# Patient Record
Sex: MALE | Race: WHITE | NOT HISPANIC OR LATINO | Employment: UNEMPLOYED | URBAN - METROPOLITAN AREA
[De-identification: names, ages, dates, MRNs, and addresses within clinical notes are randomized per-mention and may not be internally consistent; named-entity substitution may affect disease eponyms.]

---
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
## 2019-01-29 ENCOUNTER — OFFICE VISIT (OUTPATIENT)
Dept: FAMILY MEDICINE CLINIC | Facility: CLINIC | Age: 19
End: 2019-01-29
Payer: COMMERCIAL

## 2019-01-29 ENCOUNTER — TELEPHONE (OUTPATIENT)
Dept: NEUROLOGY | Facility: CLINIC | Age: 19
End: 2019-01-29

## 2019-01-29 VITALS
BODY MASS INDEX: 27.99 KG/M2 | SYSTOLIC BLOOD PRESSURE: 100 MMHG | DIASTOLIC BLOOD PRESSURE: 78 MMHG | HEIGHT: 69 IN | TEMPERATURE: 97.4 F | WEIGHT: 189 LBS

## 2019-01-29 DIAGNOSIS — J30.9 ALLERGIC RHINITIS, UNSPECIFIED SEASONALITY, UNSPECIFIED TRIGGER: ICD-10-CM

## 2019-01-29 DIAGNOSIS — J01.00 ACUTE NON-RECURRENT MAXILLARY SINUSITIS: Primary | ICD-10-CM

## 2019-01-29 DIAGNOSIS — H69.80 DYSFUNCTION OF EUSTACHIAN TUBE, UNSPECIFIED LATERALITY: ICD-10-CM

## 2019-01-29 PROCEDURE — 1036F TOBACCO NON-USER: CPT | Performed by: PHYSICIAN ASSISTANT

## 2019-01-29 PROCEDURE — 3008F BODY MASS INDEX DOCD: CPT | Performed by: PHYSICIAN ASSISTANT

## 2019-01-29 PROCEDURE — 99214 OFFICE O/P EST MOD 30 MIN: CPT | Performed by: PHYSICIAN ASSISTANT

## 2019-01-29 RX ORDER — AZITHROMYCIN 250 MG/1
TABLET, FILM COATED ORAL
Qty: 6 TABLET | Refills: 0 | Status: SHIPPED | OUTPATIENT
Start: 2019-01-29 | End: 2019-02-03

## 2019-01-29 NOTE — PATIENT INSTRUCTIONS
1   Acute sinusitis-recommended Zithromax Z-Kael as directed  The patient was also advised to use Flonase, 2 sprays in each nostril daily  They may use over-the-counter NSAIDs such as ibuprofen as necessary for pressure  Follow-up in the next 4-5 days if not improved  2  Allergic rhinitis-stable with Singulair, no medication changes  3  Eustachian tube dysfunction-continue Flonase as necessary

## 2019-01-29 NOTE — PROGRESS NOTES
Assessment/Plan:  Patient Instructions   1  Acute sinusitis-recommended Zithromax Z-Kael as directed  The patient was also advised to use Flonase, 2 sprays in each nostril daily  They may use over-the-counter NSAIDs such as ibuprofen as necessary for pressure  Follow-up in the next 4-5 days if not improved  2  Allergic rhinitis-stable with Singulair, no medication changes  3  Eustachian tube dysfunction-continue Flonase as necessary  Diagnoses and all orders for this visit:    Acute non-recurrent maxillary sinusitis  -     azithromycin (ZITHROMAX) 250 mg tablet; Take 2 Tabs by mouth today, then Take 1 tablet daily for 4 more days  Allergic rhinitis, unspecified seasonality, unspecified trigger    Dysfunction of Eustachian tube, unspecified laterality          Subjective:   C/o pressure around left eye, can't hear well out of ears  Onset today  mjs     Patient ID: May Mckeon is a 22 y o  male  HPI:  This is a 65-year-old gentleman that presents to the office with concerns over pressure behind his left eye and forehead area  He has also had some sinus congestion and a little bit of ear discomfort  He does have concern of a possible ear infection as he has had these recurrently in the past   He has not had any fevers or chills  He does not have any photophobia or sensitivity to light or noise is  He has not had any history of migraine headaches previously  He does note that the pain and pressure seems to exacerbate when he is tilting his head forward or turning his head side to side quickly  The following portions of the patient's history were reviewed and updated as appropriate: allergies, current medications, past family history, past medical history, past social history, past surgical history and problem list     Review of Systems   Constitutional: Negative for activity change, fatigue and fever  HENT: Positive for congestion and sinus pressure   Negative for ear pain, postnasal drip, rhinorrhea and sore throat  Respiratory: Negative for cough, chest tightness, shortness of breath and wheezing  Cardiovascular: Negative for palpitations  Gastrointestinal: Negative for diarrhea and nausea  Musculoskeletal: Negative for arthralgias and myalgias  Skin: Negative for rash  Neurological: Negative for dizziness and numbness  All other systems reviewed and are negative  Objective:      /78   Temp (!) 97 4 °F (36 3 °C)   Ht 5' 9" (1 753 m)   Wt 85 7 kg (189 lb)   BMI 27 91 kg/m²          Physical Exam   Constitutional: He is oriented to person, place, and time  He appears well-developed and well-nourished  No distress  HENT:   Head: Normocephalic and atraumatic  Mouth/Throat: No oropharyngeal exudate  Turbinates are erythematous and edematous bilaterally  Post nasal drip of the posterior pharynx noted  Eyes: Pupils are equal, round, and reactive to light  EOM are normal  Right eye exhibits no discharge  Left eye exhibits no discharge  Neck: Neck supple  Cardiovascular: Normal rate, regular rhythm and normal heart sounds  Exam reveals no friction rub  No murmur heard  Pulmonary/Chest: Effort normal and breath sounds normal  No respiratory distress  He has no wheezes  He has no rales  Musculoskeletal: Normal range of motion  He exhibits no edema  Lymphadenopathy:     He has cervical adenopathy  Neurological: He is alert and oriented to person, place, and time  No cranial nerve deficit  Coordination normal    Skin: Skin is warm and dry  No erythema  Psychiatric: He has a normal mood and affect  His behavior is normal    Nursing note and vitals reviewed

## 2019-05-20 ENCOUNTER — OFFICE VISIT (OUTPATIENT)
Dept: FAMILY MEDICINE CLINIC | Facility: CLINIC | Age: 19
End: 2019-05-20
Payer: COMMERCIAL

## 2019-05-20 VITALS
WEIGHT: 186 LBS | BODY MASS INDEX: 27.55 KG/M2 | HEART RATE: 64 BPM | SYSTOLIC BLOOD PRESSURE: 114 MMHG | HEIGHT: 69 IN | DIASTOLIC BLOOD PRESSURE: 62 MMHG

## 2019-05-20 DIAGNOSIS — J30.9 ALLERGIC RHINITIS, UNSPECIFIED SEASONALITY, UNSPECIFIED TRIGGER: ICD-10-CM

## 2019-05-20 DIAGNOSIS — R04.0 ANTERIOR EPISTAXIS: Primary | ICD-10-CM

## 2019-05-20 PROBLEM — J34.89 NASAL CRUSTING: Status: ACTIVE | Noted: 2019-05-20

## 2019-05-20 PROCEDURE — 99213 OFFICE O/P EST LOW 20 MIN: CPT | Performed by: FAMILY MEDICINE

## 2019-05-20 PROCEDURE — 3008F BODY MASS INDEX DOCD: CPT | Performed by: FAMILY MEDICINE

## 2019-05-20 RX ORDER — ALBUTEROL SULFATE 90 UG/1
2 AEROSOL, METERED RESPIRATORY (INHALATION) EVERY 4 HOURS PRN
COMMUNITY

## 2019-05-23 ENCOUNTER — OFFICE VISIT (OUTPATIENT)
Dept: URGENT CARE | Age: 19
End: 2019-05-23
Payer: COMMERCIAL

## 2019-05-23 VITALS
BODY MASS INDEX: 27.25 KG/M2 | SYSTOLIC BLOOD PRESSURE: 131 MMHG | TEMPERATURE: 97.5 F | HEART RATE: 85 BPM | OXYGEN SATURATION: 97 % | RESPIRATION RATE: 16 BRPM | HEIGHT: 69 IN | DIASTOLIC BLOOD PRESSURE: 66 MMHG | WEIGHT: 184 LBS

## 2019-05-23 DIAGNOSIS — J06.9 VIRAL URI WITH COUGH: Primary | ICD-10-CM

## 2019-05-23 PROCEDURE — 99213 OFFICE O/P EST LOW 20 MIN: CPT | Performed by: NURSE PRACTITIONER

## 2020-11-30 ENCOUNTER — OFFICE VISIT (OUTPATIENT)
Dept: FAMILY MEDICINE CLINIC | Facility: CLINIC | Age: 20
End: 2020-11-30
Payer: COMMERCIAL

## 2020-11-30 VITALS
TEMPERATURE: 99.1 F | BODY MASS INDEX: 28.53 KG/M2 | HEIGHT: 69 IN | DIASTOLIC BLOOD PRESSURE: 64 MMHG | WEIGHT: 192.6 LBS | RESPIRATION RATE: 16 BRPM | HEART RATE: 72 BPM | SYSTOLIC BLOOD PRESSURE: 102 MMHG

## 2020-11-30 DIAGNOSIS — E66.3 OVERWEIGHT (BMI 25.0-29.9): ICD-10-CM

## 2020-11-30 DIAGNOSIS — Z13.220 SCREENING FOR LIPID DISORDERS: ICD-10-CM

## 2020-11-30 DIAGNOSIS — R35.0 URINATION FREQUENCY: ICD-10-CM

## 2020-11-30 DIAGNOSIS — Z11.4 SCREENING FOR HIV (HUMAN IMMUNODEFICIENCY VIRUS): ICD-10-CM

## 2020-11-30 DIAGNOSIS — Z00.00 HEALTH CARE MAINTENANCE: ICD-10-CM

## 2020-11-30 DIAGNOSIS — N50.811 PAIN IN RIGHT TESTICLE: Primary | ICD-10-CM

## 2020-11-30 LAB
SL AMB  POCT GLUCOSE, UA: NORMAL
SL AMB LEUKOCYTE ESTERASE,UA: NORMAL
SL AMB POCT BILIRUBIN,UA: NORMAL
SL AMB POCT BLOOD,UA: NORMAL
SL AMB POCT CLARITY,UA: CLEAR
SL AMB POCT COLOR,UA: YELLOW
SL AMB POCT KETONES,UA: NORMAL
SL AMB POCT NITRITE,UA: NORMAL
SL AMB POCT PH,UA: 7
SL AMB POCT SPECIFIC GRAVITY,UA: 1.01
SL AMB POCT URINE PROTEIN: NORMAL
SL AMB POCT UROBILINOGEN: 0.2

## 2020-11-30 PROCEDURE — 81002 URINALYSIS NONAUTO W/O SCOPE: CPT | Performed by: FAMILY MEDICINE

## 2020-11-30 PROCEDURE — 87591 N.GONORRHOEAE DNA AMP PROB: CPT | Performed by: FAMILY MEDICINE

## 2020-11-30 PROCEDURE — 87086 URINE CULTURE/COLONY COUNT: CPT | Performed by: FAMILY MEDICINE

## 2020-11-30 PROCEDURE — 87491 CHLMYD TRACH DNA AMP PROBE: CPT | Performed by: FAMILY MEDICINE

## 2020-11-30 PROCEDURE — 99214 OFFICE O/P EST MOD 30 MIN: CPT | Performed by: FAMILY MEDICINE

## 2020-11-30 RX ORDER — NAPROXEN SODIUM 220 MG
220 TABLET ORAL 2 TIMES DAILY WITH MEALS
COMMUNITY

## 2020-12-01 LAB — BACTERIA UR CULT: NORMAL

## 2020-12-03 LAB
C TRACH DNA SPEC QL NAA+PROBE: NEGATIVE
N GONORRHOEA DNA SPEC QL NAA+PROBE: NEGATIVE

## 2022-04-14 NOTE — TELEPHONE ENCOUNTER
CARDIOVASCULAR MEDICINE, FOLLOW-UP    COVID-19 Screening:    • Does the patient OR patient’s household members have any of the following symptoms?  o Temperature: Fever ?100.0°F or ?37.8°C?  No  o Respiratory symptoms: New or worsening cough, shortness of breath, difficulty breathing, or sore throat? No  o GI symptoms: New onset of nausea, vomiting or diarrhea?  No  o Miscellaneous: New onset of loss of taste or smell, chills, repeated shaking with chills, muscle pain, headache, congestion or runny nose?  No  • Has the patient or a household member tested positive for COVID-19 in the last 14 days?  No  • Has the patient or a household member been tested for COVID-19 and are waiting for the results?  No    CHIEF COMPLAINT:    NICM, pulmonary hypertension    HISTORY OF PRESENT ILLNESS:     Lazara Dooley is a 77 y/o female with paroxysmal Afib on chronic warfarin, NICM s/p upgrade to BiV, RV enlargement/dysfunction, severe TR, pulmonary hypertension, ESRD on hemodialysis, RICK and morbid obesity, who presents for follow-up with Cardiology.    Last office visit: 9/1/2021    Since last office visit, she's been transitioned to hemodialysis, which she undergoes three times weekly.    She's also now planned to undergo R shoulder surgery with Dr. Carlyle Townsend ([facility]) in 5/2022.     Specifically, she denies any episodes of chest pain, new/worsened SOB/PATEL, lower extremity edema, unexplained weight gain, orthopnea/PND or syncope.     She reports that home weights have been quite stable, and measured weight today is down to ~103 kg.     She presents again today in a wheelchair. She's unable to ambulate up a flight of stairs.     CARDIAC RISK FACTORS:     Tobacco: lifetime non-smoker  Hypertension: yes  Hyperlipidemia: yes  Diabetes: pre-diabetes     CARDIAC DIAGNOSTICS:     LE VENOUS DUPLEX, 1/26/2022:  1. There is no evidence of  acute deep vein thrombosis in the right leg.  2. There is no evidence of  acute deep vein  MRA head and MRV were completed - patient has unremarkable findings as per the reports  Results were discussed with [facility] personally, no follow up required  thrombosis in the left leg.  3. Left-sided popliteal/Baker's cyst    NM STRESS TEST, 9/14/2021:  1. Normal myocardial perfusion scans following a regadenoson injection and at rest.  2. Normal post-regadenoson LV systolic function.  3. Cardiac Risk Assessment: low  4. No previous study for comparison    EKG, 2/16/2022:  Sinus bradycardia   Nonspecific ST abnormality   Abnormal ECG          ECHO, 10/21/2020:  1. Mild left ventricular enlargement, with normal systolic function, LVEF 66%. No regional wall motion abnormalities. Normal left ventricular systolic function. Abnormal septal motion. At least Grade I/IV diastolic dysfunction.  2. Severe right ventricular enlargement, with mildly reduced systolic function.  3. Severe biatrial enlargement.  4. Mild-moderate mitral regurgitation.  5. Severe tricuspid valve regurgitation.  6. Mild aortic valve regurgitation.  7. Severe pulmonary hypertension, RVSP 76 mmHg.  8. Mildly dilated proximal ascending aorta, max dimension 4 cm.  Compared to the TTE performed on 8/23/2020, there are some changes. Global LVEF has normalized, but severe pulmonary hypertension is again noted. Also, the degree of mitral regurgitation appears to have mildly worsened.     XR CHEST, 9/2/2020:  Pulmonary vascular congestion and interstitial edema consistent with low-grade congestive heart failure/fluid overload     PACEMAKER UPGRADE, 9/1/2020:  1) Upgrade of left-sided [employer]  dual chamber permanent pacemaker to [employer] biventricular pacemaker via left axillary venous approach  -Coronary sinus angiogram  -Insertion of left ventricular lead  -Explant of existing dual chamber permanent pacemaker pulse generator  -Insertion of biventricular permanent pacemaker pulse generator     US CAROTID, 12/9/2019:  1. RIGHT CAROTID: There is no hemodynamically significant stenosis using NASCET criteria adapted to duplex ultrasound. No significant plaque formation.  2. LEFT CAROTID: There is less than 50%  stenosis using NASCET criteria adapted to duplex ultrasound. Mild focal plaque formation as detailed above.  3. Vertebral artery flow is antegrade bilaterally.     US ARTUR, 1/23/2019:  Resting ankle-brachial indices of 1.52 on the right and 0.55 on the left. Ankle-brachial indices are likely artificially elevated due to arterial wall calcification. Consider further evaluation with lower extremity arterial duplex, if deemed clinically necessary.     PACEMAKER IMPLANT, 7/10/2017:  Successful implantation of a dual chamber pacemaker     HOLTER, 5/15/2017:  The rhythm is slow ectopic atrial rhythm and junctional rhythm with occasional accelerated idioventricular rhythm. There were also episodes of bigeminy PVCs, monomorphic. Episode of atrial fibrillation with controlled ventricular response also noted. The average heart rate was 50, minimal of 30, maximum 102 beats per minute. There were 125 ventricular ectopies with 2 ventricular runs of idioventricular rhythm, the longest was 35 beats at 52 beats per minute.   There were 204 supraventricular ectopies with 1 atrial run lasting 3 beats with paroxysmal atrial tachycardia at 111 beats per minute.      SLEEP STUDY, 5/5/2011:  Sub optimal response to CPAP on sleep study. CPAP at a pressure of 8 cm of water appears to improve apnea-hypopnea events and oxygen desaturation during REM sleep. The patient can be started on this pressure, and if well tolerated and beneficial then pressure can be titrated to carefully up. Follow up with overnight pulse oximetry can be helpful. CPAP might be more beneficial in the home environment as patient appeared to be very anxious in the sleep laboratory. Sleep onset maintenance insomnia could be in part due to the sleep laboratory environment.     Outpatient Medications Marked as Taking for the 4/18/22 encounter (Office Visit) with Emery Santoro MD   Medication Sig Dispense Refill   • metoPROLOL succinate (TOPROL-XL) 50 MG 24 hr tablet Take  0.5 tablets by mouth nightly. 90 tablet 3   • warfarin (COUMADIN) 4 MG tablet Take 1 tablet by mouth nightly. 90 tablet 2   • warfarin (COUMADIN) 1 MG tablet (warfarin) Take 0.5 tablets by mouth 3 days a week.     • midodrine (PROAMATINE) 10 MG tablet Take 1 tablet by mouth 2 times daily. 180 tablet 1   • nystatin (MYCOSTATIN) 992666 UNIT/GM powder Apply top bid prn rash in skin folds 30 g 3   • triamcinolone (ARISTOCORT) 0.1 % cream Apply top bid prn rash or itch on legs 45 g 1   • Euthyrox 25 MCG tablet Take 1 tablet by mouth once daily 90 tablet 0   • calcium acetate gelcap (PHOSLO) 667 MG capsule Take 1 capsule by mouth 3 times daily with meals. 90 capsule 11   • atorvastatin (LIPITOR) 20 MG tablet Take 1 tablet by mouth every morning. 90 tablet 3   • B Complex-C-Folic Acid (Yuli-Nacho) Tab Take 1 tablet by mouth once daily 90 tablet 3   • clindamycin (CLEOCIN) 300 MG capsule TAKE 2 CAPSULES 1 HOUR PRIOR TO DENTAL VISIT     • Epoetin Dmitry (EPOGEN IJ) Inject 1,200 Units into the vein 3 days a week.     • doxercalciferol (Hectorol) 2 MCG/ML injection Inject 2 mcg into the vein 3 days a week.     • Iron Sucrose (VENOFER IV) Inject 50 mg into the vein 1 day a week.     • acetaminophen (TYLENOL) 325 MG tablet Take 2 tablets by mouth every 4 hours as needed for Pain. 30 tablet 0     ALLERGIES:   Allergen Reactions   • Keflex GI UPSET   • Adhesive   (Environmental) RASH     paper tape ok per patient   • Sulfa Antibiotics RASH     Initial rash was many years ago.  Re-challenge in 2013 and she broke out in a rash on day 8.     Past Medical History:   Diagnosis Date   • [employer] ([employer]) MRI BiV pacemaker, 9/1/2020 09/02/2020    7/10/2017: initial dual ppm, Pre-op Dx: Symptomatic bradycardia/ junctional 9/01/2020: upgrade to BiV ppm, Pre-op Dx: NICMP , complete heart block Remote Monitor: Merlin and cell adapter  Anticoagulation: Coumadin    • Anxiety     mild    • Arthritis of knee, degenerative    • Atrial  fibrillation (CMS/HCC) 03/01/2007    chronic   • Atrial tachycardia (CMS/HCC) 05/04/2018   • Cancer of skin 06/12/2017   • Cardiomyopathy (CMS/[facility])    • cataract bilat. 07/16/2013   • Chronic anticoagulation 10/12/2017   • Chronic pain of both knees 03/18/2018   • Chronic right-sided CHF (congestive heart failure) (CMS/[facility]) 02/02/2019    RV dysfunction   • Chronic venous insufficiency    • COVID-19 07/28/2021    Mild symptoms, decreased appetite, cough   • Dermatophytosis of nail    • Hyperlipidemia    • Hypertension    • Hypothyroidism 06/25/2018   • Junctional bradycardia 07/11/2017    Abbott dual chamber Permanent Pacemaker placed 7/10/17 for symptomatic junctional bradycardia and sinus arrest. Normal function. Paroxysmal Atrial Fibrillation.  Since at least 2009. Asymptomatic. Slow ectopic atrial rhythm and junctional bradycardia on Holter monitor 5/3/17. She was on historically on metoprolol for years for HTN but she became more persi   • Kidney stones 01/01/1992    with calcium oxalate; acute renal failure due to right heart failure, on HD December 2018 through March 4, 2019   • RICK (obstructive sleep apnea)     refuses CPAP   • Osteoporosis    • Positive colorectal cancer screening using Cologuard test    • Pseudogout 04/22/2018   • Pulmonary hypertension (CMS/[facility]) 2010   • S/P biventricular cardiac pacemaker procedure     Abbott BiV pacemaker QUADRA ALLURE MP   • Stage 5 chronic kidney disease (CMS/[facility]) 02/02/2019    dialysis summer 2020    • transient CHB (complete heart block) (CMS/[facility]) 06/24/2019   • Tricuspid valve regurgitation      severe.    • Type 2 diabetes, diet controlled (CMS/[facility])    • Venous stasis ulcer (CMS/[facility]) 09/30/2013   • VT (ventricular tachycardia) (CMS/[facility]) 06/24/2019     Past Surgical History:   Procedure Laterality Date   • Biopsy of breast, incisional     • Colonoscopy remove lesions by snare  06/21/2019    Dr. Cortes, Adenoma Polyps, F/U 3 years   • Lap place peritoneal cath  permanent  09/16/2021    Dr Palma   • Lap place peritoneal cath permanent  09/16/2021    peritoneal catheter insertion - Dr. Kyaw Palma   • Laparoscopy,abdm,chetna,oment,dx  11/16/2021    Diagnostic laparoscopy with catheter manipulation - Dr. Kyaw Palma   • Mohs 1 stage upto5 tissue blocks hnhfg Left 09/05/2017    BCC left nose tip   • Ppm dual generator insert  07/10/2017    [facility]   • Remove perm cannula/catheter  03/15/2022    Removal PD catheter - Dr. Kyaw Palma   • Tonsillectomy     • Total abdom hysterectomy     • Upgrade to bi-v implant  09/01/2020     Family History   Adopted: Yes   Problem Relation Age of Onset   • Hypertension Mother    • Blood Disorder Mother         pancytopenia   • Other Father         age    • Cancer, Breast Sister         breast   • Kidney disease Brother         dialysis   • Hypertension Brother    • Anxiety disorder Brother    • Lung Disease Brother         pleural effusion   • Patient is unaware of any medical problems Daughter    • Patient is unaware of any medical problems Son      Social History     Socioeconomic History   • Marital status:      Spouse name: Not on file   • Number of children: 2   • Years of education: Not on file   • Highest education level: Not on file   Occupational History   • Occupation:      Employer: [employer]     Comment: [employer]  RETIRED    Tobacco Use   • Smoking status: Never Smoker   • Smokeless tobacco: Never Used   Vaping Use   • Vaping Use: never used   Substance and Sexual Activity   • Alcohol use: Not Currently     Comment: beer very rarely   • Drug use: No   • Sexual activity: Not Currently   Other Topics Concern   •  Service Not Asked   • Blood Transfusions No   • Caffeine Concern No   • Occupational Exposure Not Asked   • Hobby Hazards Not Asked   • Sleep Concern Yes   • Stress Concern Yes   • Weight Concern Yes   • Special Diet Yes   • Back Care No   • Exercise No   • Bike Helmet Not Asked   • Seat Belt  Yes   • Self-Exams Not Asked   Social History Narrative        Tends to me nonadherent.  Spends a lot of time living at her daughter's home.        What are your living arrangements? family members        What type of residence do you live in?  private residence        Any potential patient safety issues? none        Do you drive yourself? drives self        Any financial problems?  none     Social Determinants of Health     Financial Resource Strain: Low Risk    • Social Determinants: Financial Resource Strain: None   Food Insecurity: No Food Insecurity   • Social Determinants: Food Insecurity: Never   Transportation Needs: No Transportation Needs   • Lack of Transportation (Medical): No   • Lack of Transportation (Non-Medical): No   Physical Activity: Inactive   • Days of Exercise per Week: 0 days   • Minutes of Exercise per Session: 0 min   Stress: Low Risk    • Social Determinants: Stress: A little bit   Social Connections: Socially Integrated   • Social Determinants: Social Connections: 5 or more times a week   Intimate Partner Violence: Not At Risk   • Social Determinants: Intimate Partner Violence Past Fear: No   • Social Determinants: Intimate Partner Violence Current Fear: No     REVIEW OF SYSTEMS:    A 12-point review of systems was conducted and all items were found to be negative, other than those stated as positive per the HPI.    PHYSICAL EXAMINATION:    Vitals:    04/18/22 1139   BP: 96/62   Pulse: 100       GENERAL: Elderly  female, in no acute distress, who presents in a wheelchair.  EYES: Normal in appearance, without conjunctival injection. Pupils equal, round and reactive to light.  ENT: Mucous membranes moist, without oral pallor.  CARDIOVASCULAR: Regular rate and rhythm, normal S1 and S2, no S3 appreciated; soft systolic murmur along the LSB. No carotid bruit. Prominent V-wave.  Chest: L-sided PPM pocket is c/d/i.  RESPIRATORY: Normal respiratory effort; lungs to auscultation  bilaterally.  GASTROINTESTINAL: Obese; soft, non-tender, non-distended. No palpable masses.  EXTREMITIES: WWP, mild pitting lower extremity edema; changes of severe CVI.  SKIN: Warm, dry and intact; no rashes or lesions.  MSK: Hands/digits without clubbing or cyanosis.  NEUROLOGIC: Alert and oriented x3.  PSYCHIATRIC: Normal mood and affect.    LABS:    Recent Labs   Lab 03/31/22  0939 03/24/22  0000 03/14/22  1430 03/11/22  0000 02/25/22  0000 02/18/22  0408 02/17/22  0555 02/16/22  1243 02/16/22  0507 02/16/22  0506 02/15/22  1739 02/09/22  1223 01/29/22  0633 01/28/22  0525 01/27/22  0518 01/25/22  2319 01/25/22  2120 11/23/21  0000 11/12/21  1704 10/25/21  0000 10/22/21  1131 09/14/21  0000 09/07/21  1407 08/14/21  1130 08/13/21  1207 06/09/21  1657 06/09/21  1417 04/26/21  0000 04/20/21  1310   HGB  --   --   --   --   --  9.9* 10.2*  --   --  10.2* 11.0*  --   --  10.2* 10.2*  --  11.3*  --  12.1  --   --   --  9.1*   < >  --    < > 10.0*  --   --    BUN  --   --   --   --   --  46* 32* 17  --  66* 53*  --    < >  --  22*   < > 69*  --  20  --  50*  --  35*   < >  --    < > 103*  --  35*   Creatinine  --   --   --   --   --  6.23* 4.58* 3.09*  --  7.47* 6.81*  --    < >  --  3.20*   < > 6.89*  --  3.32*  --  5.10*  --  4.60*   < >  --    < > 9.33*  --  4.21*   Hemoglobin A1C  --   --  6.0*  --   --   --   --   --   --   --   --   --   --   --   --   --   --   --   --   --   --   --   --   --  6.1*  --   --   --   --    Potassium  --   --   --   --   --  4.8 4.4 3.8  --  5.9* 5.4*  --    < >  --  4.5   < > 7.3*  --  4.1  --  5.3*  --  4.3   < >  --    < > 6.6*  --  5.5*   NT-proBNP  --   --   --   --   --   --   --   --   --   --   --   --   --   --  7,883*  --   --   --   --   --  5,979*  --   --   --   --   --   --   --  8,681*   Cholesterol  --   --   --   --   --   --   --   --   --   --   --   --   --   --   --   --   --   --  152  --   --   --   --   --   --   --   --   --   --    HDL  --   --   --   --    --   --   --   --   --   --   --   --   --   --   --   --   --   --  78  --   --   --   --   --   --   --   --   --   --    Cholesterol/ HDL Ratio  --   --   --   --   --   --   --   --   --   --   --   --   --   --   --   --   --   --  1.9  --   --   --   --   --   --   --   --   --   --    Triglycerides  --   --   --   --   --   --   --   --   --   --   --   --   --   --   --   --   --   --  127  --   --   --   --   --   --   --   --   --   --    CALCLDL  --   --   --   --   --   --   --   --   --   --   --   --   --   --   --   --   --   --  49  --   --   --   --   --   --   --   --   --   --    GOT/AST  --   --   --   --   --   --   --   --   --   --  18  --   --   --   --   --  19  --   --   --   --   --   --   --   --   --  20  --   --    GPT  --   --   --   --   --   --   --   --   --   --  24  --   --   --   --   --  26  --   --   --   --   --   --   --   --   --  20  --   --    INR 2.5 2.7 1.3 2.9 2.2 1.4 1.8  --    < >  --  4.3  --    < > 2.1 2.3   < >  --    < >  --    < >  --    < >  --    < > 10.0*   < > 2.5   < >  --    TSH  --   --  0.612  --   --   --   --   --   --   --   --  2.270  --   --   --   --   --   --   --   --   --   --  1.774  --  2.261  --   --   --   --     < > = values in this interval not displayed.       Cholesterol (mg/dL)   Date Value   11/12/2021 152     HDL (mg/dL)   Date Value   11/12/2021 78     Triglycerides (mg/dL)   Date Value   11/12/2021 127     LDL (mg/dL)   Date Value   11/12/2021 49       ASSESSMENT/PLAN:    Lazara Dooley is a 77 y/o female with paroxysmal Afib on chronic warfarin, NICM s/p upgrade to BiV, RV enlargement/dysfunction, severe TR, pulmonary hypertension, ESRD on dialysis, RICK and morbid obesity, who presents for follow-up with Cardiology.     #. Cardiac perioperative risk assessment  #. Chronic HF(recovered)EF  #. RV enlargement/dysfunction  #. s/p upgrade to BiV PPM  #. ESRD, now on hemodialysis  #. Afib, on Warfarin  #. TR, at least moderate  #.  Pulmonary hypertension  #. Morbid obesity  #. Pre-diabetes     NYHA III, stage C     She denies any new/worsened symptoms today.    As per the HPI, she's now transitioned to hemodialysis, and weights appear to be stable (~103 kg). She continues to follow with Dr. Wade, Nephrology.    Stress testing from 9/2021 with low-risk, and without evidence of ischemia. Global LVEF normalized, after upgrade to BiV.    What I'm most concerned with currently is degree of pulmonary hypertension. Plan to repeat a TTE, in further evaluation of this. If still quite elevated, she would need to proceed with shoulder surgery with Dr. Townsend with Cardiac (high-risk) Anesthesiology.    As before, it's explained she's at some elevated (but not prohibitive) risk of cardiovascular complication, with Hx of CHF and baseline Cr >2 = Revised Cardiac Risk Index of 2. Continue statin and B-blocker, throughout the perioperative period.     Otherwise, plan to continue current meds:     Daily Warfarin, Midodrine 10 mg twice daily, Toprol XL 25 mg daily, Levothyroxine and Atorvastatin 20 mg daily. Unable to add on ACE-I or ARB, related to ESRD. She continue to deny any problems with bleeding.     She'll continue to follow with Nephrology and in CHF Clinic. Note that she's also established with MANE Gomez.     Planned follow-up with me in ~6 months; sooner, if there are any clinical changes before then.     Emery Santoro MD, FACC  [facility]

## 2022-06-24 ENCOUNTER — APPOINTMENT (OUTPATIENT)
Dept: RADIOLOGY | Facility: MEDICAL CENTER | Age: 22
End: 2022-06-24
Payer: COMMERCIAL

## 2022-06-24 ENCOUNTER — OFFICE VISIT (OUTPATIENT)
Dept: OBGYN CLINIC | Facility: MEDICAL CENTER | Age: 22
End: 2022-06-24
Payer: COMMERCIAL

## 2022-06-24 VITALS
SYSTOLIC BLOOD PRESSURE: 113 MMHG | HEIGHT: 69 IN | BODY MASS INDEX: 28.14 KG/M2 | HEART RATE: 67 BPM | DIASTOLIC BLOOD PRESSURE: 76 MMHG | WEIGHT: 190 LBS

## 2022-06-24 DIAGNOSIS — G89.29 CHRONIC PAIN OF LEFT KNEE: ICD-10-CM

## 2022-06-24 DIAGNOSIS — M25.562 CHRONIC PAIN OF LEFT KNEE: ICD-10-CM

## 2022-06-24 DIAGNOSIS — M25.562 CHRONIC PAIN OF LEFT KNEE: Primary | ICD-10-CM

## 2022-06-24 DIAGNOSIS — M22.2X2 PATELLOFEMORAL PAIN SYNDROME OF LEFT KNEE: ICD-10-CM

## 2022-06-24 DIAGNOSIS — G89.29 CHRONIC PAIN OF LEFT KNEE: Primary | ICD-10-CM

## 2022-06-24 PROCEDURE — 99203 OFFICE O/P NEW LOW 30 MIN: CPT | Performed by: STUDENT IN AN ORGANIZED HEALTH CARE EDUCATION/TRAINING PROGRAM

## 2022-06-24 PROCEDURE — 73564 X-RAY EXAM KNEE 4 OR MORE: CPT

## 2022-06-24 PROCEDURE — 1036F TOBACCO NON-USER: CPT | Performed by: STUDENT IN AN ORGANIZED HEALTH CARE EDUCATION/TRAINING PROGRAM

## 2022-06-24 PROCEDURE — 3008F BODY MASS INDEX DOCD: CPT | Performed by: STUDENT IN AN ORGANIZED HEALTH CARE EDUCATION/TRAINING PROGRAM

## 2022-06-24 NOTE — PROGRESS NOTES
1  Chronic pain of left knee  XR knee 4+ vw left injury    Ambulatory Referral to Physical Therapy   2  Patellofemoral pain syndrome of left knee  Ambulatory Referral to Physical Therapy     Orders Placed This Encounter   Procedures    XR knee 4+ vw left injury    Ambulatory Referral to Physical Therapy        Imaging Studies (I personally reviewed images in PACS and report):     X-ray left knee 06/24/2022:  No acute osseous abnormalities  No significant degenerative changes  IMPRESSION:   Chronic intermittent left knee pain secondary to patellofemoral knee pain syndrome   Ongoing issue for past 2-3 years; no osseous abnormalities on imaging obtained today    PLAN:     Clinical exam and radiographic imaging reviewed with patient today, with impression as per above  I have discussed with the patient the pathophysiology of this diagnosis and reviewed how the examination correlates with this diagnosis   Imaging obtained of his left knee today as noted above  I will follow-up official radiology interpretation   I counseled the treatment for this issue is typically conservative with formal physical therapy are a home exercise program   Patient was agreeable to start physical therapy which was referred today  I recommended a minimum of 6 weeks before transitioning to a home exercise program    In regards to pain control recommended p r n  Use of acetaminophen/NSAIDs, heat/ice therapy 20 minutes on/off  I did offer a patella stabilizing knee brace as well but patient deferred this option for now  Return in about 6 weeks (around 8/5/2022), or if symptoms worsen or fail to improve  Portions of the record may have been created with voice recognition software  Occasional wrong word or "sound a like" substitutions may have occurred due to the inherent limitations of voice recognition software  Read the chart carefully and recognize, using context, where substitutions have occurred       CHIEF COMPLAINT:  Chief Complaint   Patient presents with    Left Knee - Pain         HPI:  Serenity Pavon is a 20 y o  male  who presents for       Visit 6/24/2022 :  Initial evaluation of left knee pain:  Patient reports has been an ongoing issue for the past 2-3 years:  He he suspects a precipitating factor was when he was in the gym and doing leg presses  He states that there was a clicking sensation of his left knee while doing a warmup set like pressing  He states after this incident he was able to weightbear and continue through his workup  However, ever since this incident he feels that he will have intermittent aggravation  Of pain over the anterior/infrapatellar/medial peripatellar intermittently  He states anything involving squatting, ascending/descending stairs can potentially aggravate this pain  He states prolonged sitting can aggravate this pain  He denies any knee swelling, discoloration, numbness/tingling, giving out or locking in extension  He states the pain was worsening approximately 1 month ago which prompted him to come for a visit today  He has no imaging of his knee in the past   He denies prior injury/surgeries of his knee in the past   States his pain does not wake him up at night and overall does not affect his activities of daily living  He is mainly concerned due to the chronicity of this pain and that every currently becomes aggravated  He denies numbness of his leg but states there can be some tingling sensation of his knee and lower leg rarely  He reports today is a good day which he has no pain of his knee  Medical, Surgical, Family, and Social History    History reviewed  No pertinent past medical history  History reviewed  No pertinent surgical history    Social History   Social History     Substance and Sexual Activity   Alcohol Use Yes    Comment: very rarely     Social History     Substance and Sexual Activity   Drug Use No     Social History     Tobacco Use Smoking Status Never Smoker   Smokeless Tobacco Never Used     Family History   Problem Relation Age of Onset    No Known Problems Mother     No Known Problems Father      No Known Allergies       Physical Exam  /76   Pulse 67   Ht 5' 9" (1 753 m)   Wt 86 2 kg (190 lb)   BMI 28 06 kg/m²     Constitutional:  see vital signs  Gen: well-developed, normocephalic/atraumatic, well-groomed  Eyes: No inflammation or discharge of conjunctiva or lids; sclera clear   Pharynx: no inflammation, lesion, or mass of lips  Neck: supple, no masses, non-distended  MSK: no inflammation, lesion, mass, or clubbing of nails and digits except for other than mentioned below  SKIN: no visible rashes or skin lesions  Pulmonary/Chest: Effort normal  No respiratory distress     NEURO: cranial nerves grossly intact  PSYCH:  Alert and oriented to person, place, and time; recent and remote memory intact; mood normal, no depression, anxiety, or agitation, judgment and insight good and intact     Ortho Exam  Left Knee Exam:  Erythema: no  Swelling: no  Increased Warmth: no  Tenderness: none  ROM: 0-130  Patellar Apprehension: negative  Patellar Grind: +  Patellar compression with quadriceps contraction: negative  Lachman's: negative  Anterior Drawer: negative  Varus laxity: negative  Valgus laxity: negative  [facility]: negative     Left hip ROM demonstrates no pain actively or passively

## 2022-07-06 ENCOUNTER — EVALUATION (OUTPATIENT)
Dept: PHYSICAL THERAPY | Facility: MEDICAL CENTER | Age: 22
End: 2022-07-06
Payer: COMMERCIAL

## 2022-07-06 DIAGNOSIS — M22.2X2 PATELLOFEMORAL PAIN SYNDROME OF LEFT KNEE: ICD-10-CM

## 2022-07-06 DIAGNOSIS — M25.562 CHRONIC PAIN OF LEFT KNEE: Primary | ICD-10-CM

## 2022-07-06 DIAGNOSIS — G89.29 CHRONIC PAIN OF LEFT KNEE: Primary | ICD-10-CM

## 2022-07-06 PROCEDURE — 97112 NEUROMUSCULAR REEDUCATION: CPT

## 2022-07-06 PROCEDURE — 97161 PT EVAL LOW COMPLEX 20 MIN: CPT

## 2022-07-06 NOTE — PROGRESS NOTES
PT Evaluation     Today's date: 2022  Patient name: Perry Dillard  : 2000  MRN: [id number]  Referring provider: Shaun Pena MD  Dx:   Encounter Diagnosis     ICD-10-CM    1  Chronic pain of left knee  M25 562 Ambulatory Referral to Physical Therapy    G89 29    2  Patellofemoral pain syndrome of left knee  M22 2X2 Ambulatory Referral to Physical Therapy       Start Time: 1515  Stop Time: 1558  Total time in clinic (min): 43 minutes    Assessment  Assessment details: Perry Dillard is a 23y/o male who presents to OP PT with a a referral from Dr Wendy Horton with a medical diagnosis of chronic pain of left knee, patellofemoral pain syndrome of left knee  Upon examination pt presents with decreased LE strength, decreased functional mobility, decreased muscular endurance, poor balance and increased pain  Previously mentioned deficits have impaired patients ability to perform ADLs, recreational activities and house hold duties  Pt was educated on examination findings, diagnosis, prognosis, home exercise program to complete  Pt states understanding and consents to skilled PT services  Pt is a good candidate for skilled PT services  Positive prognositic indicators include desire to improve and active lifestyle  Negative prognostic indicators include chronicity of issue  Pt would benefit from skilled PT services to address functional deficits to return to PLOF  Impairments: abnormal gait, abnormal muscle firing, abnormal or restricted ROM, abnormal movement, activity intolerance, impaired physical strength, lacks appropriate home exercise program, pain with function and poor body mechanics    Symptom irritability: lowUnderstanding of Dx/Px/POC: good   Prognosis: good    Goals  STG 2-6 weeks    1  Patient will demonstrate increased hip abduction strength to 4+/5 to improve gait mechanics in midstance and pre-swing stages    2       Patient will be able to perform deep squat and return to standing without pain    3  Pt will report a 2-3 point decrease on NPRS for improved tolerance to ADLs, recreational activities and work duties         LTG 6 weeks to discharge    1  Patient will be independent with HEP for continued progress  2      Patient will demonstrate 5/5 in all MMT to improve tolerance for performing work and recreational activities and allow for return to prolonged community ambulation    3  Patient will attain FOTO score of antcipated or greater at time of discharge  4      Patient will return to PLOF in all recreational activities, work duties and ADLs  Plan  Patient would benefit from: PT eval and skilled physical therapy  Referral necessary: No  Planned modality interventions: cryotherapy, TENS, high voltage pulsed current: pain management, high voltage pulsed current: spasm management, thermotherapy: hydrocollator packs and thermotherapy: paraffin bath  Planned therapy interventions: joint mobilization, manual therapy, massage, ADL training, balance, neuromuscular re-education, patient education, strengthening, stretching, therapeutic activities, therapeutic exercise, flexibility, functional ROM exercises, gait training, graded exercise, home exercise program, activity modification, abdominal trunk stabilization and ADL retraining  Frequency: 1-2x every other week 2/2 copay  Plan of Care beginning date: 7/6/2022  Plan of Care expiration date: 9/28/2022  Treatment plan discussed with: patient        Subjective Evaluation    History of Present Illness  Mechanism of injury: Subjective: Patient states he was leg pressing three months ago and states he noticed some medial knee pain  He states since then he has had occasional instability, and notes pain with his typical squatting workouts, walking long distances/backpacking as well as occasional pain at work  He has used a knee compression sleeve with no significant improvement   Patient has returned to his workouts however is extremely hesitant due to his occasional pain and instability  Pain  Type: sharp, dull, throbbing  Current: 0/10  Best: 0/10  Worst: 6/10  Alleviates: n/a  Aggravates: squatting, pivoting    Occupation:     Imaging:  unremarkable    PMH: ADHD    Previous Surgeries: none    Previous Physical Activity: daily gym/strength workouts    Current Medications:see file    BUSHRA: acute  "pivot with foot on leg press plate"    Surgery Date: n/a     MD: Dr Nikko Klein    Patient Goals: to get back to working out with confidence, minimize feelings of instability               Objective     Tenderness   Left Knee   Tenderness in the medial joint line, medial patella and patellar tendon  Active Range of Motion   Left Knee   Flexion: WFL  Extension: WFL    Passive Range of Motion   Left Knee   Flexion: WFL  Prone flexion: WFL  Extension: WFL    Mobility   Patellar Mobility:   Left Knee   WFL: medial, lateral, superior and inferior  Patellar Static Positioning   Left Knee: lateral tilt    Strength/Myotome Testing     Left Hip   Planes of Motion   Flexion: 4-  Extension: 4-  Abduction: 4-  Adduction: 3+  External rotation: 3+    Right Hip   Planes of Motion   Flexion: 5  Extension: 5  Abduction: 5  Adduction: 4  External rotation: 4    Left Knee   Flexion: 4-  Prone flexion: 4-  Extension: 4-    Right Knee   Flexion: 5  Prone flexion: 4  Extension: 5    Tests     Left Knee   Positive patella-femoral grind  Negative anterior drawer, anterior Lachman, lateral [place], medial [place], patellar apprehension, patellar compression, posterior drawer, Thessaly's test at 5 degrees, Thessaly's test at 20 degrees, valgus stress test at 0 degrees and valgus stress test at 30 degrees  Functional Assessment        Forward Step Down 6"   Left Leg  Contralateral trunk side bending and valgus  Right Leg  Within functional limits                Precautions: universal      Manuals 7/6/2022              KT Tape PRN Neuro Re-Ed             Clamshell HEP            Reverse Clamshell HEP            Bridge w/ Abd HEP            TKE HEP            Step Down                                       Ther Ex             Bike             SL LP                                                                                           Ther Activity                                       Gait Training                                       Modalities

## 2022-07-15 ENCOUNTER — OFFICE VISIT (OUTPATIENT)
Dept: PHYSICAL THERAPY | Facility: MEDICAL CENTER | Age: 22
End: 2022-07-15
Payer: COMMERCIAL

## 2022-07-15 DIAGNOSIS — G89.29 CHRONIC PAIN OF LEFT KNEE: Primary | ICD-10-CM

## 2022-07-15 DIAGNOSIS — M22.2X2 PATELLOFEMORAL PAIN SYNDROME OF LEFT KNEE: ICD-10-CM

## 2022-07-15 DIAGNOSIS — M25.562 CHRONIC PAIN OF LEFT KNEE: Primary | ICD-10-CM

## 2022-07-15 PROCEDURE — 97110 THERAPEUTIC EXERCISES: CPT

## 2022-07-15 PROCEDURE — 97112 NEUROMUSCULAR REEDUCATION: CPT

## 2022-07-15 NOTE — PROGRESS NOTES
Daily Note     Today's date: 7/15/2022  Patient name: Taylor Holguin  : 2000  MRN: [id number]  Referring provider: Chris Robison MD  Dx:   Encounter Diagnosis     ICD-10-CM    1  Chronic pain of left knee  M25 562     G89 29    2  Patellofemoral pain syndrome of left knee  M22 2X2        Start Time: 0730  Stop Time: 8983  Total time in clinic (min): 42 minutes    Subjective: Pt states feeling well  He states minor knee pain compared to previous visit  Objective: See treatment diary below      Assessment: Tolerated treatment well  Patient demonstrates a dynamic valgus with Trap Bar dead lift exercise this session  Improves with cues  Patient demonsttrates increased arch collpase/pronation with unilateral TRX squat this session, without shoe patient demonstrates significant improvement in control  Patient demonstrated fatigue post treatment and would benefit from continued PT      Plan: Continue per plan of care  Progress treatment as tolerated         Precautions: universal      Manuals 2022   7/15/2022             KT Tape PRN                                                   Neuro Re-Ed             Clamshell HEP            Reverse Clamshell HEP            Bridge w/ Abd HEP            TKE HEP            Step Down  6" 3x10           Lateral Band Walk  BLK 10x laps at [address] 5" 2x10           Bird Dog  2x10            Ther Ex             Bike  5'           SL LP  NV           Trap Bar Deadlift  3x10 115#           TRX Split Squat  3x10           Pt Ed  alternative foot wear with medial arch support, progression to unilateral quadriceps strengthening during personal workouts                                                  Ther Activity                                       Gait Training                                       Modalities

## 2022-07-29 ENCOUNTER — OFFICE VISIT (OUTPATIENT)
Dept: PHYSICAL THERAPY | Facility: MEDICAL CENTER | Age: 22
End: 2022-07-29
Payer: COMMERCIAL

## 2022-07-29 DIAGNOSIS — M25.562 CHRONIC PAIN OF LEFT KNEE: Primary | ICD-10-CM

## 2022-07-29 DIAGNOSIS — M22.2X2 PATELLOFEMORAL PAIN SYNDROME OF LEFT KNEE: ICD-10-CM

## 2022-07-29 DIAGNOSIS — G89.29 CHRONIC PAIN OF LEFT KNEE: Primary | ICD-10-CM

## 2022-07-29 PROCEDURE — 97112 NEUROMUSCULAR REEDUCATION: CPT

## 2022-07-29 PROCEDURE — 97110 THERAPEUTIC EXERCISES: CPT

## 2022-07-29 NOTE — PROGRESS NOTES
Daily Note     Today's date: 2022  Patient name: Yajaira Garcia  : 2000  MRN: [id number]  Referring provider: Sarita Terrazas MD  Dx:   Encounter Diagnosis     ICD-10-CM    1  Chronic pain of left knee  M25 562     G89 29    2  Patellofemoral pain syndrome of left knee  M22 2X2        Start Time: 0815  Stop Time: 0900  Total time in clinic (min): 45 minutes    Subjective: Pt states feeling well  He is no longer experience pain with work or any of his lower body strength workouts  Objective: See treatment diary below      Assessment: Tolerated treatment well  Patient was educated to continue HEP, instructed to utilize as a warmup prior to his strength training  Patient was further educated on importance of addressing unilateral strength imbalances for decreased risk of injury  Patient states understanding  At this time patient is independent with HEP and has a resolution of symptoms that initially made him seek skilled PT services  Pt is appropriate for skilled PT discharge  Plan: Discharge at this time       Precautions: universal      Manuals 2022   7/15/2022   2022            KT Tape PRN                                                   Neuro Re-Ed             Clamshell HEP            Reverse Clamshell HEP            Bridge w/ Abd HEP            TKE HEP            Step Down  6" 3x10 8" 3x10          Lateral Band Walk  BLK 10x laps at mirror BLK PB 10x laps at [employer]  GTB 5" 2x10           Bird Dog  2x10  1x10, w/ foam roller          Ther Ex             Bike  5' 5'          SL LP  NV           Trap Bar Deadlift  3x10 115#           TRX Split Squat  3x10           Pt Ed  alternative foot wear with medial arch support, progression to unilateral quadriceps strengthening during personal workouts Safe progression of resistance, HEP review, with emphasis on pre strength training" warm up, closed chain alternative exercise          Front Foot elevated lunge   2x10 BL          Back Foot elevated lunge   2x10 BL          Single Leg RDL   2x10 BL          Ther Activity                                       Gait Training                                       Modalities

## 2022-08-16 PROBLEM — G93.0 INTRACRANIAL ARACHNOID CYST: Status: RESOLVED | Noted: 2018-09-28 | Resolved: 2022-08-16

## 2022-08-16 PROBLEM — J34.89 NASAL CRUSTING: Status: RESOLVED | Noted: 2019-05-20 | Resolved: 2022-08-16

## 2022-08-16 PROBLEM — R04.0 ANTERIOR EPISTAXIS: Status: RESOLVED | Noted: 2019-05-20 | Resolved: 2022-08-16

## 2022-08-16 PROBLEM — F07.81 POSTCONCUSSION SYNDROME: Status: RESOLVED | Noted: 2018-09-28 | Resolved: 2022-08-16

## 2022-08-16 PROBLEM — L70.0 ACNE VULGARIS: Status: RESOLVED | Noted: 2018-04-18 | Resolved: 2022-08-16

## 2022-08-16 PROBLEM — R93.0 ABNORMAL MRI OF HEAD: Status: RESOLVED | Noted: 2018-09-18 | Resolved: 2022-08-16

## 2022-08-17 ENCOUNTER — OFFICE VISIT (OUTPATIENT)
Dept: FAMILY MEDICINE CLINIC | Facility: CLINIC | Age: 22
End: 2022-08-17
Payer: COMMERCIAL

## 2022-08-17 ENCOUNTER — APPOINTMENT (OUTPATIENT)
Dept: LAB | Facility: CLINIC | Age: 22
End: 2022-08-17
Payer: COMMERCIAL

## 2022-08-17 ENCOUNTER — HOSPITAL ENCOUNTER (OUTPATIENT)
Dept: CT IMAGING | Facility: HOSPITAL | Age: 22
Discharge: HOME | End: 2022-08-17
Payer: COMMERCIAL

## 2022-08-17 VITALS
TEMPERATURE: 97.3 F | SYSTOLIC BLOOD PRESSURE: 120 MMHG | HEIGHT: 69 IN | HEART RATE: 84 BPM | BODY MASS INDEX: 27.85 KG/M2 | DIASTOLIC BLOOD PRESSURE: 70 MMHG | WEIGHT: 188 LBS

## 2022-08-17 DIAGNOSIS — E66.3 OVERWEIGHT (BMI 25.0-29.9): ICD-10-CM

## 2022-08-17 DIAGNOSIS — H69.80 DYSFUNCTION OF EUSTACHIAN TUBE, UNSPECIFIED LATERALITY: ICD-10-CM

## 2022-08-17 DIAGNOSIS — Z13.220 SCREENING FOR LIPID DISORDERS: ICD-10-CM

## 2022-08-17 DIAGNOSIS — Z11.4 SCREENING FOR HIV (HUMAN IMMUNODEFICIENCY VIRUS): ICD-10-CM

## 2022-08-17 DIAGNOSIS — R00.2 PALPITATIONS: ICD-10-CM

## 2022-08-17 DIAGNOSIS — Z00.00 HEALTH CARE MAINTENANCE: ICD-10-CM

## 2022-08-17 DIAGNOSIS — F90.2 ADHD (ATTENTION DEFICIT HYPERACTIVITY DISORDER), COMBINED TYPE: ICD-10-CM

## 2022-08-17 DIAGNOSIS — R04.0 EPISTAXIS: ICD-10-CM

## 2022-08-17 DIAGNOSIS — L05.91 PILONIDAL CYST OF NATAL CLEFT: ICD-10-CM

## 2022-08-17 DIAGNOSIS — J30.9 ALLERGIC RHINITIS, UNSPECIFIED SEASONALITY, UNSPECIFIED TRIGGER: ICD-10-CM

## 2022-08-17 DIAGNOSIS — Z11.59 NEED FOR HEPATITIS C SCREENING TEST: ICD-10-CM

## 2022-08-17 DIAGNOSIS — R55 SYNCOPE, UNSPECIFIED SYNCOPE TYPE: Primary | ICD-10-CM

## 2022-08-17 DIAGNOSIS — R55 SYNCOPE, UNSPECIFIED SYNCOPE TYPE: ICD-10-CM

## 2022-08-17 LAB
ALBUMIN SERPL BCP-MCNC: 4.1 G/DL (ref 3.5–5)
ALP SERPL-CCNC: 84 U/L (ref 46–116)
ALT SERPL W P-5'-P-CCNC: 29 U/L (ref 12–78)
ANION GAP SERPL CALCULATED.3IONS-SCNC: 4 MMOL/L (ref 4–13)
AST SERPL W P-5'-P-CCNC: 25 U/L (ref 5–45)
BILIRUB SERPL-MCNC: 0.48 MG/DL (ref 0.2–1)
BILIRUB UR QL STRIP: NEGATIVE
BUN SERPL-MCNC: 18 MG/DL (ref 5–25)
CALCIUM SERPL-MCNC: 9.1 MG/DL (ref 8.3–10.1)
CHLORIDE SERPL-SCNC: 105 MMOL/L (ref 96–108)
CHOLEST SERPL-MCNC: 204 MG/DL
CLARITY UR: CLEAR
CO2 SERPL-SCNC: 30 MMOL/L (ref 21–32)
COLOR UR: NORMAL
CREAT SERPL-MCNC: 1.19 MG/DL (ref 0.6–1.3)
ERYTHROCYTE [DISTWIDTH] IN BLOOD BY AUTOMATED COUNT: 12.1 % (ref 11.6–15.1)
GFR SERPL CREATININE-BSD FRML MDRD: 85 ML/MIN/1.73SQ M
GLUCOSE P FAST SERPL-MCNC: 66 MG/DL (ref 65–99)
GLUCOSE UR STRIP-MCNC: NEGATIVE MG/DL
HCT VFR BLD AUTO: 43.5 % (ref 36.5–49.3)
HCV AB SER QL: NORMAL
HDLC SERPL-MCNC: 49 MG/DL
HGB BLD-MCNC: 14.5 G/DL (ref 12–17)
HGB UR QL STRIP.AUTO: NEGATIVE
KETONES UR STRIP-MCNC: NEGATIVE MG/DL
LDLC SERPL CALC-MCNC: 141 MG/DL (ref 0–100)
LEUKOCYTE ESTERASE UR QL STRIP: NEGATIVE
MCH RBC QN AUTO: 29.7 PG (ref 26.8–34.3)
MCHC RBC AUTO-ENTMCNC: 33.3 G/DL (ref 31.4–37.4)
MCV RBC AUTO: 89 FL (ref 82–98)
NITRITE UR QL STRIP: NEGATIVE
PH UR STRIP.AUTO: 6 [PH]
PLATELET # BLD AUTO: 219 THOUSANDS/UL (ref 149–390)
PMV BLD AUTO: 10.7 FL (ref 8.9–12.7)
POTASSIUM SERPL-SCNC: 3.8 MMOL/L (ref 3.5–5.3)
PROT SERPL-MCNC: 7.5 G/DL (ref 6.4–8.4)
PROT UR STRIP-MCNC: NEGATIVE MG/DL
RBC # BLD AUTO: 4.88 MILLION/UL (ref 3.88–5.62)
SODIUM SERPL-SCNC: 139 MMOL/L (ref 135–147)
SP GR UR STRIP.AUTO: 1.02 (ref 1–1.03)
TRIGL SERPL-MCNC: 69 MG/DL
TSH SERPL DL<=0.05 MIU/L-ACNC: 1.43 UIU/ML (ref 0.45–4.5)
UROBILINOGEN UR STRIP-ACNC: <2 MG/DL
WBC # BLD AUTO: 4.72 THOUSAND/UL (ref 4.31–10.16)

## 2022-08-17 PROCEDURE — 87389 HIV-1 AG W/HIV-1&-2 AB AG IA: CPT

## 2022-08-17 PROCEDURE — G1004 CDSM NDSC: HCPCS

## 2022-08-17 PROCEDURE — 80061 LIPID PANEL: CPT

## 2022-08-17 PROCEDURE — 70450 CT HEAD/BRAIN W/O DYE: CPT

## 2022-08-17 PROCEDURE — 93000 ELECTROCARDIOGRAM COMPLETE: CPT | Performed by: FAMILY MEDICINE

## 2022-08-17 PROCEDURE — 81003 URINALYSIS AUTO W/O SCOPE: CPT

## 2022-08-17 PROCEDURE — 84443 ASSAY THYROID STIM HORMONE: CPT

## 2022-08-17 PROCEDURE — 99215 OFFICE O/P EST HI 40 MIN: CPT | Performed by: FAMILY MEDICINE

## 2022-08-17 PROCEDURE — 36415 COLL VENOUS BLD VENIPUNCTURE: CPT

## 2022-08-17 PROCEDURE — 80053 COMPREHEN METABOLIC PANEL: CPT

## 2022-08-17 PROCEDURE — 86803 HEPATITIS C AB TEST: CPT

## 2022-08-17 PROCEDURE — 3725F SCREEN DEPRESSION PERFORMED: CPT | Performed by: FAMILY MEDICINE

## 2022-08-17 PROCEDURE — 85027 COMPLETE CBC AUTOMATED: CPT

## 2022-08-17 NOTE — PATIENT INSTRUCTIONS
Complete blood work today  Complete CT of head today  I recommend patient use Vaseline to anterior septal areas at bedtime   May continue ocean nasal spray as needed  Follow-up with general surgery, Dr Briana Peña, for pilonidal cyst   Recheck 2 weeks  If syncope reoccurs call 911 report to ER

## 2022-08-17 NOTE — PROGRESS NOTES
Assessment and Plan:  1  Syncope  2  Palpitation  EKG completed  EKG normal  Blood work ordered   CT head ordered  2  Allergic rhinitis/eustachian tube dysfunction, stable on Allegra  3  BMI 27 76 diet exercise weight loss recommended   4  ADHD, in remission off medication  5  Healthcare maintenance, blood work ordered to include CDC recommended hepatitis-C and HIV screening, lipid panel is ordered   6  Pilonidal cyst, refer to general surgery, Dr Ilene Cortez  7  Epistaxis, continue ocean nasal spray I recommend Vaseline to anterior septal area at bedtime  8  Recheck 2 weeks if syncope recurs patient report emergency department    Problem List Items Addressed This Visit        Respiratory    Allergic rhinitis     Stable on Allegra         Relevant Orders    CBC    Comprehensive metabolic panel    Lipid Panel with Direct LDL reflex    TSH, 3rd generation with Free T4 reflex    UA (URINE) with reflex to Scope       Nervous and Auditory    Eustachian tube dysfunction     As above         Relevant Orders    CBC    Comprehensive metabolic panel    Lipid Panel with Direct LDL reflex    TSH, 3rd generation with Free T4 reflex    UA (URINE) with reflex to Scope       Other    ADHD (attention deficit hyperactivity disorder), combined type     In remission off medication         Relevant Orders    CBC    Comprehensive metabolic panel    Lipid Panel with Direct LDL reflex    TSH, 3rd generation with Free T4 reflex    UA (URINE) with reflex to Scope    Palpitations    Relevant Orders    CBC    Comprehensive metabolic panel    Lipid Panel with Direct LDL reflex    TSH, 3rd generation with Free T4 reflex    UA (URINE) with reflex to Scope    Overweight (BMI 25 0-29  9)     BMI 27 76         Relevant Orders    CBC    Comprehensive metabolic panel    Lipid Panel with Direct LDL reflex    TSH, 3rd generation with Free T4 reflex    UA (URINE) with reflex to Scope    Health care maintenance     Hepatitis-C and HIV screening discussed orders placed routine blood work is ordered         Relevant Orders    CBC    Comprehensive metabolic panel    Lipid Panel with Direct LDL reflex    TSH, 3rd generation with Free T4 reflex    UA (URINE) with reflex to Scope      Other Visit Diagnoses     Syncope, unspecified syncope type    -  Primary    Relevant Orders    CBC    Comprehensive metabolic panel    Lipid Panel with Direct LDL reflex    TSH, 3rd generation with Free T4 reflex    UA (URINE) with reflex to Scope    CT head wo contrast    POCT ECG (Completed)    Need for hepatitis C screening test        Relevant Orders    Hepatitis C Antibody ([facility])    CBC    Comprehensive metabolic panel    Lipid Panel with Direct LDL reflex    TSH, 3rd generation with Free T4 reflex    UA (URINE) with reflex to Scope    Screening for HIV (human immunodeficiency virus)        Relevant Orders    HIV 1/2 Antigen/Antibody (4th Generation) w Reflex [facility]    CBC    Comprehensive metabolic panel    Lipid Panel with Direct LDL reflex    TSH, 3rd generation with Free T4 reflex    UA (URINE) with reflex to Scope    Screening for lipid disorders        Relevant Orders    CBC    Comprehensive metabolic panel    Lipid Panel with Direct LDL reflex    TSH, 3rd generation with Free T4 reflex    UA (URINE) with reflex to Scope    Epistaxis        Pilonidal cyst of  cleft        Relevant Orders    Ambulatory Referral to General Surgery                 Diagnoses and all orders for this visit:    Syncope, unspecified syncope type  -     CBC; Future  -     Comprehensive metabolic panel; Future  -     Lipid Panel with Direct LDL reflex; Future  -     TSH, 3rd generation with Free T4 reflex; Future  -     UA (URINE) with reflex to Scope; Future  -     CT head wo contrast; Future  -     POCT ECG    Palpitations  -     CBC; Future  -     Comprehensive metabolic panel; Future  -     Lipid Panel with Direct LDL reflex;  Future  -     TSH, 3rd generation with Free T4 reflex; Future  -     UA (URINE) with reflex to Scope; Future    Overweight (BMI 25 0-29 9)  -     CBC; Future  -     Comprehensive metabolic panel; Future  -     Lipid Panel with Direct LDL reflex; Future  -     TSH, 3rd generation with Free T4 reflex; Future  -     UA (URINE) with reflex to Scope; Future    Allergic rhinitis, unspecified seasonality, unspecified trigger  -     CBC; Future  -     Comprehensive metabolic panel; Future  -     Lipid Panel with Direct LDL reflex; Future  -     TSH, 3rd generation with Free T4 reflex; Future  -     UA (URINE) with reflex to Scope; Future    Dysfunction of Eustachian tube, unspecified laterality  -     CBC; Future  -     Comprehensive metabolic panel; Future  -     Lipid Panel with Direct LDL reflex; Future  -     TSH, 3rd generation with Free T4 reflex; Future  -     UA (URINE) with reflex to Scope; Future    ADHD (attention deficit hyperactivity disorder), combined type  -     CBC; Future  -     Comprehensive metabolic panel; Future  -     Lipid Panel with Direct LDL reflex; Future  -     TSH, 3rd generation with Free T4 reflex; Future  -     UA (URINE) with reflex to Scope; Future    Health care maintenance  -     CBC; Future  -     Comprehensive metabolic panel; Future  -     Lipid Panel with Direct LDL reflex; Future  -     TSH, 3rd generation with Free T4 reflex; Future  -     UA (URINE) with reflex to Scope; Future    Need for hepatitis C screening test  -     Hepatitis C Antibody ([facility]); Future  -     CBC; Future  -     Comprehensive metabolic panel; Future  -     Lipid Panel with Direct LDL reflex; Future  -     TSH, 3rd generation with Free T4 reflex; Future  -     UA (URINE) with reflex to Scope; Future    Screening for HIV (human immunodeficiency virus)  -     HIV 1/2 Antigen/Antibody (4th Generation) w Reflex [facility]; Future  -     CBC; Future  -     Comprehensive metabolic panel; Future  -     Lipid Panel with Direct LDL reflex;  Future  -     TSH, 3rd generation with Free T4 reflex; Future  -     UA (URINE) with reflex to Scope; Future    Screening for lipid disorders  -     CBC; Future  -     Comprehensive metabolic panel; Future  -     Lipid Panel with Direct LDL reflex; Future  -     TSH, 3rd generation with Free T4 reflex; Future  -     UA (URINE) with reflex to Scope; Future    Epistaxis    Pilonidal cyst of sydnee cleft  -     Ambulatory Referral to General Surgery; Future            Subjective:      Patient ID: Tyshawn Gresham is a 20 y o  male  CC:    Chief Complaint   Patient presents with    Cyst     C/o cyst on tailbone for approximately 4 years  Constantly for approx 1 year  He can only feel it when he is doing core exercises  mjs    Syncope     C/o yesterday suddenly at work he passed out after standing  HPI:    Yesterday patient was at his break room and was squatting and stood up and passed out for about 4 seconds was weakness no tonic clonic motion no loss of bowel and bladder control  No postictal state  No tongue or cheek biting  Patient also states over the past few months he did have some episodic nose bleeds but seem resolved using ocean nasal spray  Patient has a lump on his tailbone for years but during exercises has caused increasing symptoms      The following portions of the patient's history were reviewed and updated as appropriate: allergies, current medications, past family history, past medical history, past social history, past surgical history and problem list       Review of Systems   Constitutional: Negative  HENT:        HPI   Eyes: Negative  Respiratory: Negative  Cardiovascular: Negative  Gastrointestinal: Negative  Endocrine: Negative  Genitourinary: Negative  Musculoskeletal: Negative  Skin:        HPI   Allergic/Immunologic: Positive for environmental allergies  Neurological:        HPI   Hematological:        HPI   Psychiatric/Behavioral: Negative            Data to review: Objective:    Vitals:    08/17/22 0912   BP: 120/70   Pulse: 84   Temp: (!) 97 3 °F (36 3 °C)   Weight: 85 3 kg (188 lb)   Height: 5' 9" (1 753 m)        Physical Exam  Vitals and nursing note reviewed  Constitutional:       Appearance: Normal appearance  Comments: Negative tilt test seated blood pressure 114/72  Standing blood pressure 114/72   HENT:      Head: Normocephalic and atraumatic  Right Ear: Tympanic membrane normal       Left Ear: Tympanic membrane normal       Nose:      Comments: Mildly xerotic bilateral anterior septal area no active bleed     Mouth/Throat:      Mouth: Mucous membranes are moist       Pharynx: Oropharynx is clear  No oropharyngeal exudate or posterior oropharyngeal erythema  Eyes:      General: No scleral icterus  Right eye: No discharge  Left eye: No discharge  Extraocular Movements: Extraocular movements intact  Conjunctiva/sclera: Conjunctivae normal       Pupils: Pupils are equal, round, and reactive to light  Neck:      Vascular: No carotid bruit  Cardiovascular:      Rate and Rhythm: Normal rate and regular rhythm  Heart sounds: Normal heart sounds  Pulmonary:      Effort: Pulmonary effort is normal       Breath sounds: Normal breath sounds  Abdominal:      General: Bowel sounds are normal  There is no distension  Palpations: Abdomen is soft  There is no mass  Tenderness: There is no abdominal tenderness  There is no right CVA tenderness, left CVA tenderness, guarding or rebound  Hernia: No hernia is present  Musculoskeletal:         General: No swelling, tenderness, deformity or signs of injury  Cervical back: Neck supple  No rigidity or tenderness  Right lower leg: No edema  Left lower leg: No edema  Lymphadenopathy:      Cervical: No cervical adenopathy  Skin:     General: Skin is warm and dry        Comments: Pea-sized cystic mass over sacrum, subcutaneously   Neurological: General: No focal deficit present  Mental Status: He is alert  Psychiatric:         Mood and Affect: Mood normal            BMI Counseling: Body mass index is 27 76 kg/m²  The BMI is above normal  Nutrition recommendations include moderation in carbohydrate intake and reducing intake of cholesterol  Exercise recommendations include exercising 3-5 times per week  Rationale for BMI follow-up plan is due to patient being overweight or obese  Depression Screening and Follow-up Plan: Patient was screened for depression during today's encounter  They screened negative with a PHQ-2 score of 0

## 2022-08-18 LAB — HIV 1+2 AB+HIV1 P24 AG SERPL QL IA: NORMAL

## 2022-08-31 ENCOUNTER — OFFICE VISIT (OUTPATIENT)
Dept: FAMILY MEDICINE CLINIC | Facility: CLINIC | Age: 22
End: 2022-08-31
Payer: COMMERCIAL

## 2022-08-31 VITALS
DIASTOLIC BLOOD PRESSURE: 78 MMHG | BODY MASS INDEX: 27.75 KG/M2 | SYSTOLIC BLOOD PRESSURE: 116 MMHG | WEIGHT: 187.38 LBS | TEMPERATURE: 96.7 F | OXYGEN SATURATION: 98 % | HEART RATE: 84 BPM | HEIGHT: 69 IN

## 2022-08-31 DIAGNOSIS — H69.80 DYSFUNCTION OF EUSTACHIAN TUBE, UNSPECIFIED LATERALITY: ICD-10-CM

## 2022-08-31 DIAGNOSIS — R55 SYNCOPE, UNSPECIFIED SYNCOPE TYPE: ICD-10-CM

## 2022-08-31 DIAGNOSIS — F40.10 SOCIAL ANXIETY DISORDER: ICD-10-CM

## 2022-08-31 DIAGNOSIS — J30.9 ALLERGIC RHINITIS, UNSPECIFIED SEASONALITY, UNSPECIFIED TRIGGER: ICD-10-CM

## 2022-08-31 DIAGNOSIS — E78.00 PURE HYPERCHOLESTEROLEMIA: ICD-10-CM

## 2022-08-31 DIAGNOSIS — R00.2 PALPITATIONS: Primary | ICD-10-CM

## 2022-08-31 DIAGNOSIS — F90.2 ADHD (ATTENTION DEFICIT HYPERACTIVITY DISORDER), COMBINED TYPE: ICD-10-CM

## 2022-08-31 PROCEDURE — 99214 OFFICE O/P EST MOD 30 MIN: CPT | Performed by: FAMILY MEDICINE

## 2022-08-31 RX ORDER — CHLORAL HYDRATE 500 MG
3000 CAPSULE ORAL DAILY
COMMUNITY

## 2022-08-31 NOTE — PATIENT INSTRUCTIONS
Low-fat low-cholesterol diet as below   I recommend fish oil 3000 mg daily   Return in 6 months for office visit and blood work sooner if needed    Cholesterol and Your Health   AMBULATORY CARE:   Cholesterol  is a waxy, fat-like substance  Cholesterol is made by your body, but also comes from certain foods you eat  Your body uses cholesterol to make hormones and new cells  Your body also uses cholesterol to protect nerves  Cholesterol comes from foods such as meat and dairy products  Your total cholesterol level is made up by LDL cholesterol, HDL cholesterol, and triglycerides:  LDL cholesterol  is called bad cholesterol  because it forms plaque in your arteries  As plaque builds up, your arteries become narrow, and less blood flows through  When plaque decreases blood flow to your heart, you may have chest pain  If plaque completely blocks an artery that bring blood to your heart, you may have a heart attack  Plaque can break off and form blood clots  Blood clots may block arteries in your brain and cause a stroke  HDL cholesterol  is called good cholesterol  because it helps remove LDL cholesterol from your arteries  It does this by attaching to LDL cholesterol and carrying it to your liver  Your liver breaks down LDL cholesterol so your body can get rid of it  High levels of HDL cholesterol can help prevent a heart attack and stroke  Low levels of HDL cholesterol can increase your risk for heart disease, heart attack, and stroke  Triglycerides  are a type of fat that store energy from foods you eat  High levels of triglycerides also cause plaque buildup  This can increase your risk for a heart attack or stroke  If your triglyceride level is high, your LDL cholesterol level may also be high  How food affects your cholesterol levels:   Unhealthy fats  increase LDL cholesterol and triglyceride levels in your blood   They are found in foods high in cholesterol, saturated fat, and trans fat: Cholesterol  is found in eggs, dairy, and meat  Saturated fat  is found in butter, cheese, ice cream, whole milk, and coconut oil  Saturated fat is also found in meat, such as sausage, hot dogs, and bologna  Trans fat  is found in liquid oils and is used in fried and baked foods  Foods that contain trans fats include chips, crackers, muffins, sweet rolls, microwave popcorn, and cookies  Healthy fats,  also called unsaturated fats, help lower LDL cholesterol and triglyceride levels  Healthy fats include the following:     Monounsaturated fats  are found in foods such as olive oil, canola oil, avocado, nuts, and olives  Polyunsaturated fats,  such as omega 3 fats, are found in fish, such as salmon, trout, and tuna  They can also be found in plant foods such as flaxseed, walnuts, and soybeans  Other things that affect your cholesterol levels:   Smoking cigarettes    Being overweight or obese     Drinking large amounts of alcohol    Not enough exercise or no exercise    Certain genes passed from your parents to you  What you need to know about having your cholesterol levels checked: Adults 21to 39years of age should have their cholesterol levels checked every 4 to 6 years  Adults 45 years and older should have their cholesterol checked every 1 to 2 years  You may need your cholesterol checked more often, or at a younger age, if you have risk factors for heart disease  You may also need to have your cholesterol checked more often if you have other health conditions, such as diabetes  Blood tests are used to check cholesterol levels  Blood tests measure your levels of triglycerides, LDL cholesterol, and HDL cholesterol  Cholesterol level goals: Your cholesterol level goal may depend on your risk for heart disease  It may also depend on your age and other health conditions  Ask your healthcare provider if the following goals are right for you:   Your total cholesterol level  should be less than 200 mg/dL  This number may also depend on your HDL and LDL cholesterol goals  Your LDL cholesterol level  should be less than 130 mg/dL  Your HDL cholesterol level  should be 60 mg/dL or higher  Your triglyceride level  should be less than 150 mg/dL  Treatment for high cholesterol:  Treatment for high cholesterol will also decrease your risk of heart disease, heart attack, and stroke  Treatment may include any of the following:  Medicines  may be given to lower your LDL cholesterol, triglyceride levels, or total cholesterol level  You may need medicines to lower your cholesterol if any of the following is true:     You have a history of stroke, TIA, unstable angina, or a heart attack    Your LDL cholesterol level is 190 mg/dL or higher    You are age 36to 76years of age, have diabetes, and your LDL cholesterol is 70 mg/dL or higher    You are age 36to 76years of age, have risk factors for heart disease, and your LDL cholesterol is 70 mg/dL or higher    Lifestyle changes  include changes to your diet, exercise, weight loss, and quitting smoking  It also includes decreasing the amount of alcohol you drink  Supplements  include fish oil, red yeast rice, and garlic  Fish oil may help lower your triglyceride and LDL cholesterol levels  It may also increase your HDL cholesterol level  Red yeast rice may help decrease your total cholesterol level and LDL cholesterol level  Garlic may help lower your total cholesterol level  Do not take these supplements without talking to your healthcare provider  Nutrition to help lower your cholesterol levels:  A registered dietitian can help you create a healthy eating plan  Read food labels and choose foods low in saturated fat, trans fats, and cholesterol  Decrease the total amount of fat you eat  Choose lean meats, fat-free or 1% fat milk, and low-fat dairy products, such as yogurt and cheese  Try to limit or avoid red meats   Limit or do not eat fried foods or baked goods such as cookies  Replace unhealthy fats with healthy fats  Cook foods in olive oil or canola oil  Choose soft margarines that are low in saturated fat and trans fat  Seeds, nuts, and avocados are other examples of healthy fats  Eat foods with omega-3 fats  Examples include salmon, tuna, mackerel, walnuts, and flaxseed  Eat fish 2 times per week  Children and pregnant women should not eat fish that have high levels of mercury, such as shark, swordfish, and omar mackerel  Increase the amount of plant-based foods you eat  Plant-based foods are low in cholesterol and fat  Eating more of these foods may help lower your cholesterol and help you lose weight  Examples of plant-based foods includes fruits, vegetables, legumes, and whole grains  Replace milk that contains dairy with almond, soy, or coconut milk  Eat beans and foods with soy for protein instead of meat  Ask your healthcare provider or dietitian for more information on plant-based foods  Increase the amount of fiber you eat  High-fiber foods can help lower your LDL cholesterol  You should eat between 20 and 30 grams of fiber each day  Eat at least 5 servings of fruits and vegetables each day  Other examples of high-fiber foods include whole-grain or whole-wheat breads, pastas, or cereals, and brown rice  Eat 3 ounces of whole-grain foods each day  Increase fiber slowly  You may have abdominal discomfort, bloating, and gas if you add fiber to your diet too quickly  Lifestyle changes you can make to help lower your cholesterol levels:   Maintain a healthy weight  Ask your healthcare provider how much you should weigh  Ask him or her to help you create a weight loss plan if you are overweight  Weight loss can decrease your total cholesterol and triglyceride levels  Exercise regularly  Exercise can help lower your total cholesterol level and maintain a healthy weight  Exercise can also help increase your HDL cholesterol level  Work with your healthcare provider to create an exercise program that is right for you  Get at least 30 minutes of moderate exercise most days of the week  Examples of exercise include brisk walking, swimming, or biking  Do not smoke  Nicotine and other chemicals in cigarettes and cigars can damage your lungs, heart, and blood vessels  They can also raise your triglyceride levels  Ask your healthcare provider for information if you currently smoke and need help to quit  E-cigarettes or smokeless tobacco still contain nicotine  Talk to your healthcare provider before you use these products  Limit or do not drink alcohol  Alcohol can increase your triglyceride levels  Ask your healthcare provider if it is safe for you to drink alcohol  Also ask how much is safe for you to drink each day  © 2017 [facility] Information is for End User's use only and may not be sold, redistributed or otherwise used for commercial purposes  All illustrations and images included in CareNotes® are the copyrighted property of [employer]  or [employer]  The above information is an  only  It is not intended as medical advice for individual conditions or treatments  Talk to your doctor, nurse or pharmacist before following any medical regimen to see if it is safe and effective for you

## 2022-08-31 NOTE — PROGRESS NOTES
Assessment and Plan:  1  ADHD, resolved  2  Anxiety, resolved  3  Palpitations, currently asymptomatic  4  Syncope, no recurrence workup was negative  5  Hypercholesterolemia, low-fat diet recommended fish oil 3000 mg daily was recommended  6  Allergic rhinitis/eustachian tube dysfunction, stable continue Allegra  7  Return in 6 months to recheck cholesterol       Problem List Items Addressed This Visit        Respiratory    Allergic rhinitis     Stable on Allegra         Relevant Orders    Comprehensive metabolic panel    Lipid Panel with Direct LDL reflex       Nervous and Auditory    Eustachian tube dysfunction     As above         Relevant Orders    Comprehensive metabolic panel    Lipid Panel with Direct LDL reflex       Other    Palpitations - Primary     Asymptomatic at the present time         Relevant Orders    Comprehensive metabolic panel    Lipid Panel with Direct LDL reflex    Social anxiety disorder     Resolved         Relevant Orders    Comprehensive metabolic panel    Lipid Panel with Direct LDL reflex    Pure hypercholesterolemia     I recommend fish oil 3000 mg daily low-fat diet         Relevant Orders    Comprehensive metabolic panel    Lipid Panel with Direct LDL reflex    RESOLVED: ADHD (attention deficit hyperactivity disorder), combined type     Resolved         Relevant Orders    Comprehensive metabolic panel    Lipid Panel with Direct LDL reflex      Other Visit Diagnoses     Syncope, unspecified syncope type                     Diagnoses and all orders for this visit:    Palpitations  -     Comprehensive metabolic panel; Future  -     Lipid Panel with Direct LDL reflex; Future    ADHD (attention deficit hyperactivity disorder), combined type  -     Comprehensive metabolic panel; Future  -     Lipid Panel with Direct LDL reflex; Future    Pure hypercholesterolemia  -     Comprehensive metabolic panel; Future  -     Lipid Panel with Direct LDL reflex;  Future    Social anxiety disorder  - Comprehensive metabolic panel; Future  -     Lipid Panel with Direct LDL reflex; Future    Allergic rhinitis, unspecified seasonality, unspecified trigger  -     Comprehensive metabolic panel; Future  -     Lipid Panel with Direct LDL reflex; Future    Dysfunction of Eustachian tube, unspecified laterality  -     Comprehensive metabolic panel; Future  -     Lipid Panel with Direct LDL reflex; Future    Syncope, unspecified syncope type    Other orders  -     Omega-3 Fatty Acids (fish oil) 1,000 mg; Take 3,000 mg by mouth daily            Subjective:      Patient ID: Sara Lynne is a 20 y o  male  CC:    Chief Complaint   Patient presents with    Follow-up     Discuss lab results  mgb       HPI:    Patient doing well without any medical complaints concerns at the present time  Blood work and CT scan were discussed with the patient  Patient had no recurrence of palpitations or syncope  Patient does tell me that his father does have high cholesterol      The following portions of the patient's history were reviewed and updated as appropriate: allergies, current medications, past family history, past medical history, past social history, past surgical history and problem list       Review of Systems   Constitutional: Negative  HENT: Negative  Eyes: Negative  Respiratory: Negative  Cardiovascular:        HPI   Gastrointestinal: Negative  Endocrine: Negative  Genitourinary: Negative  Musculoskeletal: Negative  Skin: Negative  Allergic/Immunologic: Negative  Neurological:        HPI   Hematological: Negative  Psychiatric/Behavioral: Negative  Data to review:       Objective:    Vitals:    08/31/22 0925   BP: 116/78   BP Location: Right arm   Patient Position: Sitting   Cuff Size: Large   Pulse: 84   Temp: (!) 96 7 °F (35 9 °C)   TempSrc: Temporal   SpO2: 98%   Weight: 85 kg (187 lb 6 oz)   Height: 5' 9" (1 753 m)        Physical Exam  Vitals and nursing note reviewed  Constitutional:       Appearance: Normal appearance  HENT:      Head: Normocephalic and atraumatic  Eyes:      General: No scleral icterus  Right eye: No discharge  Left eye: No discharge  Extraocular Movements: Extraocular movements intact  Conjunctiva/sclera: Conjunctivae normal       Pupils: Pupils are equal, round, and reactive to light  Neck:      Vascular: No carotid bruit  Cardiovascular:      Rate and Rhythm: Normal rate and regular rhythm  Heart sounds: Normal heart sounds  Pulmonary:      Effort: Pulmonary effort is normal       Breath sounds: Normal breath sounds  Musculoskeletal:      Cervical back: Neck supple  Right lower leg: No edema  Left lower leg: No edema  Skin:     General: Skin is warm and dry  Neurological:      General: No focal deficit present  Mental Status: He is alert     Psychiatric:         Mood and Affect: Mood normal

## 2022-09-13 ENCOUNTER — CONSULT (OUTPATIENT)
Dept: SURGERY | Facility: CLINIC | Age: 22
End: 2022-09-13
Payer: COMMERCIAL

## 2022-09-13 VITALS
TEMPERATURE: 76 F | BODY MASS INDEX: 26.98 KG/M2 | DIASTOLIC BLOOD PRESSURE: 75 MMHG | SYSTOLIC BLOOD PRESSURE: 110 MMHG | HEIGHT: 69 IN | HEART RATE: 77 BPM | WEIGHT: 182.2 LBS | RESPIRATION RATE: 16 BRPM

## 2022-09-13 DIAGNOSIS — L05.91 PILONIDAL CYST OF NATAL CLEFT: ICD-10-CM

## 2022-09-13 PROCEDURE — 99243 OFF/OP CNSLTJ NEW/EST LOW 30: CPT | Performed by: SURGERY

## 2022-09-13 NOTE — PROGRESS NOTES
Assessment/Plan:    Pilonidal cyst of sydnee cleft    He does have physical findings consistent with a pilonidal cyst   However he appears to be mostly asymptomatic  There is very minimal extent of the disease with only 2 small openings  He has never had any significant drainage or pain  This point recommend conservative management  If he becomes more symptomatic can then be re-evaluated at any time  I explained to him that the surgery would require excision of the area including all the tracts and full-thickness  Difficult part is then to have the wound heal   I recommend healing by secondary intention which can take up to 3 months or sometimes longer to heal   This point he is going to continue conservative management and follow-up p r n  Diagnoses and all orders for this visit:    Pilonidal cyst of  cleft  -     Ambulatory Referral to General Surgery          Subjective:      Patient ID: Marcio Guzmán is a 20 y o  male  Mr Rajiv Vargas  Is a 20 old gentleman here for evaluation of pilonidal cyst   Patient states for several years he has noticed occasional episodes of discomfort the gluteal cleft  More recently he has having ongoing discomfort for last 6-8 months  He notices specially when he is doing core exercises and he has been all his weight on this [place] is is when he notices the most   He denies drainage  Denies infections in the past   He denies significant pain  The following portions of the patient's history were reviewed and updated as appropriate: allergies, current medications, past family history, past medical history, past social history, past surgical history and problem list     Review of Systems   Constitutional: Negative for chills and fever  HENT: Negative for ear pain and sore throat  Eyes: Negative for pain and visual disturbance  Respiratory: Negative for cough and shortness of breath  Cardiovascular: Negative for chest pain and palpitations  Gastrointestinal: Negative for abdominal pain and vomiting  Genitourinary: Negative for dysuria and hematuria  Musculoskeletal: Negative for arthralgias and back pain  Skin: Negative for color change and rash  Neurological: Negative for seizures and syncope  Psychiatric/Behavioral: Negative for agitation and behavioral problems  All other systems reviewed and are negative  Objective:      /75   Pulse 77   Temp (!) 76 °F (24 4 °C)   Resp 16   Ht 5' 9" (1 753 m)   Wt 82 6 kg (182 lb 3 2 oz)   BMI 26 91 kg/m²          Physical Exam  Vitals and nursing note reviewed  Constitutional:       General: He is not in acute distress  Appearance: He is well-developed  He is not diaphoretic  HENT:      Head: Normocephalic and atraumatic  Eyes:      Pupils: Pupils are equal, round, and reactive to light  Cardiovascular:      Rate and Rhythm: Normal rate and regular rhythm  Heart sounds: Normal heart sounds  No murmur heard  Pulmonary:      Effort: Pulmonary effort is normal  No respiratory distress  Breath sounds: Normal breath sounds  No wheezing  Abdominal:      General: Bowel sounds are normal       Palpations: Abdomen is soft  Musculoskeletal:         General: Normal range of motion  Cervical back: Normal range of motion and neck supple  Skin:     General: Skin is warm and dry  Comments: In the gluteal cleft there are 2 tiny sinus openings  No tenderness  No fluctuance  Neurological:      Mental Status: He is alert and oriented to person, place, and time     Psychiatric:         Behavior: Behavior normal

## 2022-09-13 NOTE — ASSESSMENT & PLAN NOTE
He does have physical findings consistent with a pilonidal cyst   However he appears to be mostly asymptomatic  There is very minimal extent of the disease with only 2 small openings  He has never had any significant drainage or pain  This point recommend conservative management  If he becomes more symptomatic can then be re-evaluated at any time  I explained to him that the surgery would require excision of the area including all the tracts and full-thickness  Difficult part is then to have the wound heal   I recommend healing by secondary intention which can take up to 3 months or sometimes longer to heal   This point he is going to continue conservative management and follow-up p r n

## 2022-09-19 NOTE — TELEPHONE ENCOUNTER
[address]  [address]  [phone]      Dany Lindsey is a 27 y.o. female who presents today for her  medical conditions/complaints as noted below. Dany Lindsey is c/o of Gynecologic Exam  .    HPI:     HPI  Doing well overall. Has neurosurgeon appt oct. GYN History:  Menstrual Hx: irregular      DOLP:8/30/22  ST1 Hx:none  Ectopic pregnancy Hx:none  Menarche:15  Cycle every 3-4 days  Duration of Cycle:   Dysmenorrhea:  Mild  Sexually Active:  yes  Dyspareunia: no    History reviewed. No pertinent past medical history. History reviewed. No pertinent surgical history. Family History   Problem Relation Age of Onset    No Known Problems Mother     No Known Problems Father     ADHD Brother      Social History     Tobacco Use    Smoking status: Never    Smokeless tobacco: Never   Substance Use Topics    Alcohol use: Yes     Comment: social      Current Outpatient Medications   Medication Sig Dispense Refill    tiZANidine (ZANAFLEX) 2 MG tablet Take 1 tablet by mouth nightly as needed (spasm) 10 tablet 0    Multiple Vitamins-Minerals (THERAPEUTIC MULTIVITAMIN-MINERALS) tablet Take 1 tablet by mouth daily      Turmeric (QC TUMERIC COMPLEX PO) Take by mouth      Collagen-Vitamin C-Biotin (COLLAGEN 1500/C) 500-50-0.8 MG CAPS Take by mouth      Calcium Carb-Cholecalciferol (CALCIUM 1000 + D) 1000-800 MG-UNIT TABS Take by mouth      Magnesium Citrate 200 MG TABS Take by mouth       No current facility-administered medications for this visit.      No Known Allergies    Health Maintenance   Topic Date Due    Varicella vaccine (1 of 2 - 2-dose childhood series) Never done    HIV screen  Never done    Hepatitis C screen  Never done    Cervical cancer screen  Never done    COVID-19 Vaccine (2 - Pfizer series) 04/30/2021    Flu vaccine (1) Never done    Depression Screen  04/18/2023    DTaP/Tdap/Td vaccine (2 - Td or Tdap) 04/18/2027    Hepatitis A vaccine  Aged Out Pt's dad called for results of MRI  We rec'd fax from [address]  Please review and have someone call Pt;s dad please  Thank you    Report put on MS desk  Hepatitis B vaccine  Aged Out    Hib vaccine  Aged Out    Meningococcal (ACWY) vaccine  Aged Out    Pneumococcal 0-64 years Vaccine  Aged Out       Subjective:      Irregular  menses, no abnormal bleeding, pelvic pain or discharge, no breast pain or new or enlarging lumps on self exam, no vaginal bleeding, no discharge or pelvic pain. No new partners. No present concerns. Objective:   BP 96/60 (Site: Left Upper Arm, Position: Sitting, Cuff Size: Medium Adult)   Pulse 58   Temp 98.4 °F (36.9 °C) (Tympanic)   Ht 5' 7\" (1.702 m)   Wt 122 lb 6.4 oz (55.5 kg)   LMP 08/30/2022 (Approximate)   SpO2 96%   BMI 19.17 kg/m²     General Appearance: alert and oriented to person, place and time, well developed and well- nourished, in no acute distress  Skin: warm and dry, no rash or erythema  Head: normocephalic and atraumatic  Neck: supple and non-tender without mass, no thyromegaly or thyroid nodules, no cervical lymphadenopathy  Pulmonary/Chest: clear to auscultation bilaterally- no wheezes, rales or rhonchi, normal air movement, no respiratory distress  Cardiovascular: normal rate, regular rhythm, normal S1 and S2, no murmurs, rubs, clicks, or gallops  Abdomen: soft, non-tender, non-distended, normal bowel sounds, no masses or organomegaly  Pelvic: normal external genitalia, vulva, vagina, cervix, uterus and adnexa. No CMT. No noted discharge or lesions. No masses noted. Breast: appear normal, no suspicious masses, no skin or nipple changes or axillary nodes bilaterally. Psych: pleasant, cooperative          Assessment:    annual pap exam   Diagnosis Orders   1.  Cervical cancer screening  PAP Smear        Wt Readings from Last 3 Encounters:   09/19/22 122 lb 6.4 oz (55.5 kg)   08/23/22 126 lb (57.2 kg)   06/21/22 121 lb 6.4 oz (55.1 kg)     BP Readings from Last 3 Encounters:   09/19/22 96/60   08/23/22 110/60   06/21/22 118/72       Plan:      Return in about 1 year (around 9/19/2023) for routine healthcare visit. Orders Placed This Encounter   Procedures    PAP Smear     Patient History:    No LMP recorded. (Menstrual status: Irregular periods). OBGYN Status: Irregular periods  No past surgical history on file. Social History    Tobacco Use      Smoking status: Never      Smokeless tobacco: Never       Standing Status:   Future     Standing Expiration Date:   9/19/2023     Order Specific Question:   Collection Type     Answer: Thin Prep     Order Specific Question:   Prior Abnormal Pap Test     Answer:   No     Order Specific Question:   Screening or Diagnostic     Answer:   Screening     Order Specific Question:   HPV Requested? Answer:   Yes     Order Specific Question:   High Risk Patient     Answer:   N/A     Will call with test results  Patient advised to follow heart healthy, low fat  diet and 150 minutes of cardiovascular exercise per week   The nature of sun-induced photo-aging and skin cancers is discussed. Sun avoidance, protective clothing, and the use of 30-SPF sunscreens is advised. Observe closely for skin damage/changes, and call if such occurs. Declines birth control or vaccines today      Patient given educational materials - see patient instructions. Discussed use, benefit, and side effects of prescribed medications. All patient questions answered. Pt voiced understanding. Reviewed health maintenance. Instructed to continue current medications, diet and exercise. Patient agreed with treatment plan. Follow up as directed below. Please note that this chart was generated using voice recognition Dragon dictation software. Although every effort was made to ensure the accuracy of this automated transcription, some errors in transcription may have occurred.      Electronically signed by ROXANNA Evans CNP, PAC on 9/19/2022 at 4:00 PM

## 2022-10-16 PROBLEM — Z00.00 HEALTH CARE MAINTENANCE: Status: RESOLVED | Noted: 2022-08-17 | Resolved: 2022-10-16

## 2023-03-07 ENCOUNTER — APPOINTMENT (OUTPATIENT)
Dept: LAB | Facility: CLINIC | Age: 23
End: 2023-03-07

## 2023-03-07 ENCOUNTER — OFFICE VISIT (OUTPATIENT)
Dept: FAMILY MEDICINE CLINIC | Facility: CLINIC | Age: 23
End: 2023-03-07

## 2023-03-07 VITALS
BODY MASS INDEX: 26.07 KG/M2 | DIASTOLIC BLOOD PRESSURE: 88 MMHG | HEIGHT: 69 IN | WEIGHT: 176 LBS | SYSTOLIC BLOOD PRESSURE: 130 MMHG | RESPIRATION RATE: 16 BRPM | HEART RATE: 88 BPM | TEMPERATURE: 98.3 F

## 2023-03-07 DIAGNOSIS — F40.10 SOCIAL ANXIETY DISORDER: ICD-10-CM

## 2023-03-07 DIAGNOSIS — E78.00 PURE HYPERCHOLESTEROLEMIA: ICD-10-CM

## 2023-03-07 DIAGNOSIS — J30.9 ALLERGIC RHINITIS, UNSPECIFIED SEASONALITY, UNSPECIFIED TRIGGER: Primary | ICD-10-CM

## 2023-03-07 DIAGNOSIS — H69.80 DYSFUNCTION OF EUSTACHIAN TUBE, UNSPECIFIED LATERALITY: ICD-10-CM

## 2023-03-07 DIAGNOSIS — F90.2 ADHD (ATTENTION DEFICIT HYPERACTIVITY DISORDER), COMBINED TYPE: ICD-10-CM

## 2023-03-07 DIAGNOSIS — L05.91 PILONIDAL CYST OF NATAL CLEFT: ICD-10-CM

## 2023-03-07 DIAGNOSIS — R00.2 PALPITATIONS: ICD-10-CM

## 2023-03-07 DIAGNOSIS — E66.3 OVERWEIGHT (BMI 25.0-29.9): ICD-10-CM

## 2023-03-07 DIAGNOSIS — J30.9 ALLERGIC RHINITIS, UNSPECIFIED SEASONALITY, UNSPECIFIED TRIGGER: ICD-10-CM

## 2023-03-07 LAB
ALBUMIN SERPL BCP-MCNC: 4.5 G/DL (ref 3.5–5)
ALP SERPL-CCNC: 83 U/L (ref 46–116)
ALT SERPL W P-5'-P-CCNC: 24 U/L (ref 12–78)
ANION GAP SERPL CALCULATED.3IONS-SCNC: 3 MMOL/L (ref 4–13)
AST SERPL W P-5'-P-CCNC: 23 U/L (ref 5–45)
BILIRUB SERPL-MCNC: 0.42 MG/DL (ref 0.2–1)
BUN SERPL-MCNC: 16 MG/DL (ref 5–25)
CALCIUM SERPL-MCNC: 9.6 MG/DL (ref 8.3–10.1)
CHLORIDE SERPL-SCNC: 104 MMOL/L (ref 96–108)
CHOLEST SERPL-MCNC: 206 MG/DL
CO2 SERPL-SCNC: 28 MMOL/L (ref 21–32)
CREAT SERPL-MCNC: 1.03 MG/DL (ref 0.6–1.3)
GFR SERPL CREATININE-BSD FRML MDRD: 101 ML/MIN/1.73SQ M
GLUCOSE SERPL-MCNC: 80 MG/DL (ref 65–140)
HDLC SERPL-MCNC: 58 MG/DL
LDLC SERPL CALC-MCNC: 136 MG/DL (ref 0–100)
POTASSIUM SERPL-SCNC: 4.1 MMOL/L (ref 3.5–5.3)
PROT SERPL-MCNC: 7.8 G/DL (ref 6.4–8.4)
SODIUM SERPL-SCNC: 135 MMOL/L (ref 135–147)
TRIGL SERPL-MCNC: 59 MG/DL

## 2023-03-07 NOTE — ASSESSMENT & PLAN NOTE
Seen by Dr Cynthia Salas    We will follow-up with him if symptoms get worse at this point he has chose observation

## 2023-03-07 NOTE — PROGRESS NOTES
Name: Mahnaz Rousseau      : 2000      MRN: [id number]  Encounter Provider: Javan Damon DO  Encounter Date: 3/7/2023   Encounter department: [address] [place] [address]   #1  Allergic rhinitis/eustachian tube dysfunction, stable on Allegra  2  Pilonidal cyst, patient did see surgery at this time observation is being used  If worsen patient will consult surgeon  3  BMI 25 99 patient lost 6 pounds continue diet exercise and weight loss  4  Palpitations, seen by cardiology in the past asymptomatic  5  Hypercholesterolemia continue fish oil complete blood work as ordered  5  Social anxiety sorter, asymptomatic  7  Return in 1 year for office visit blood work sooner if needed        1  Allergic rhinitis, unspecified seasonality, unspecified trigger  Assessment & Plan:  Stable continue Allegra    Orders:  -     CBC; Future; Expected date: 2024  -     Comprehensive metabolic panel; Future; Expected date: 2024  -     Lipid Panel with Direct LDL reflex; Future; Expected date: 2024  -     TSH, 3rd generation with Free T4 reflex; Future; Expected date: 2024  -     UA (URINE) with reflex to Scope; Future; Expected date: 2024    2  Dysfunction of Eustachian tube, unspecified laterality  Assessment & Plan:  As above    Orders:  -     CBC; Future; Expected date: 2024  -     Comprehensive metabolic panel; Future; Expected date: 2024  -     Lipid Panel with Direct LDL reflex; Future; Expected date: 2024  -     TSH, 3rd generation with Free T4 reflex; Future; Expected date: 2024  -     UA (URINE) with reflex to Scope; Future; Expected date: 2024    3  Pilonidal cyst of  cleft  Assessment & Plan:  Seen by Dr Tyron Rodriguez  We will follow-up with him if symptoms get worse at this point he has chose observation    Orders:  -     CBC; Future; Expected date: 2024  -     Comprehensive metabolic panel;  Future; Expected date: 03/07/2024  -     Lipid Panel with Direct LDL reflex; Future; Expected date: 03/07/2024  -     TSH, 3rd generation with Free T4 reflex; Future; Expected date: 03/07/2024  -     UA (URINE) with reflex to Scope; Future; Expected date: 03/07/2024    4  Overweight (BMI 25 0-29  9)  Assessment & Plan:  BMI 25 99 patient lost 6 pounds continue diet exercise weight loss    Orders:  -     CBC; Future; Expected date: 03/07/2024  -     Comprehensive metabolic panel; Future; Expected date: 03/07/2024  -     Lipid Panel with Direct LDL reflex; Future; Expected date: 03/07/2024  -     TSH, 3rd generation with Free T4 reflex; Future; Expected date: 03/07/2024  -     UA (URINE) with reflex to Scope; Future; Expected date: 03/07/2024    5  Palpitations  Assessment & Plan:  Asymptomatic at the present time    Orders:  -     CBC; Future; Expected date: 03/07/2024  -     Comprehensive metabolic panel; Future; Expected date: 03/07/2024  -     Lipid Panel with Direct LDL reflex; Future; Expected date: 03/07/2024  -     TSH, 3rd generation with Free T4 reflex; Future; Expected date: 03/07/2024  -     UA (URINE) with reflex to Scope; Future; Expected date: 03/07/2024    6  Pure hypercholesterolemia  Assessment & Plan:  Continue fish oil complete blood work as ordered    Orders:  -     CBC; Future; Expected date: 03/07/2024  -     Comprehensive metabolic panel; Future; Expected date: 03/07/2024  -     Lipid Panel with Direct LDL reflex; Future; Expected date: 03/07/2024  -     TSH, 3rd generation with Free T4 reflex; Future; Expected date: 03/07/2024  -     UA (URINE) with reflex to Scope; Future; Expected date: 03/07/2024    7  Social anxiety disorder  Assessment & Plan:  Asymptomatic at the present time    Orders:  -     CBC; Future; Expected date: 03/07/2024  -     Comprehensive metabolic panel; Future; Expected date: 03/07/2024  -     Lipid Panel with Direct LDL reflex;  Future; Expected date: 03/07/2024  -     TSH, 3rd generation with Free T4 reflex; Future; Expected date: 03/07/2024  -     UA (URINE) with reflex to Scope; Future; Expected date: 03/07/2024      BMI Counseling: Body mass index is 25 99 kg/m²  The BMI is above normal  Nutrition recommendations include reducing intake of cholesterol  Exercise recommendations include exercising 3-5 times per week  Rationale for BMI follow-up plan is due to patient being overweight or obese  Subjective      Patient doing well he is lost 6 pounds he is watching his diet and exercising  Did see general surgery 9/13/2022  At that point he had risk and benefit of surgery was discussed with the patient  Patient chose observation if it becomes more symptomatic he will follow-up with general surgery, Dr Antonietta Marquez  Unfortunately, patient did not do blood work he will complete today    Review of Systems   Constitutional: Negative for chills and fever  HENT: Negative for ear pain and sore throat  Eyes: Negative for pain and visual disturbance  Respiratory: Negative for cough and shortness of breath  Cardiovascular: Negative for chest pain and palpitations  Gastrointestinal: Negative for abdominal pain and vomiting  Genitourinary: Negative for dysuria and hematuria  Musculoskeletal: Negative for arthralgias and back pain  Skin: Negative for color change and rash  HPI   Neurological: Negative for seizures and syncope  All other systems reviewed and are negative        Current Outpatient Medications on File Prior to Visit   Medication Sig   • fexofenadine (ALLEGRA) 180 MG tablet Take 180 mg by mouth daily as needed   • Omega-3 Fatty Acids (fish oil) 1,000 mg Take 3,000 mg by mouth daily   • [DISCONTINUED] albuterol (PROVENTIL HFA,VENTOLIN HFA) 90 mcg/act inhaler Inhale 2 puffs every 4 (four) hours as needed       Objective     /88 (BP Location: Right arm, Patient Position: Sitting, Cuff Size: Adult)   Pulse 88   Temp 98 3 °F (36 8 °C) (Temporal)   Resp 16   Ht 5' 9" (1 753 m)   Wt 79 8 kg (176 lb)   BMI 25 99 kg/m²     Physical Exam  Vitals and nursing note reviewed  Constitutional:       Appearance: Normal appearance  HENT:      Head: Normocephalic and atraumatic  Right Ear: Tympanic membrane normal       Left Ear: Tympanic membrane normal       Nose: Nose normal       Mouth/Throat:      Mouth: Mucous membranes are moist       Pharynx: Oropharynx is clear  No oropharyngeal exudate or posterior oropharyngeal erythema  Eyes:      General: No scleral icterus  Neck:      Vascular: No carotid bruit  Cardiovascular:      Rate and Rhythm: Normal rate and regular rhythm  Heart sounds: Normal heart sounds  Pulmonary:      Effort: Pulmonary effort is normal       Breath sounds: Normal breath sounds  Abdominal:      General: Bowel sounds are normal       Palpations: Abdomen is soft  Tenderness: There is no abdominal tenderness  Musculoskeletal:      Cervical back: Neck supple  Right lower leg: No edema  Left lower leg: No edema  Lymphadenopathy:      Cervical: No cervical adenopathy  Skin:     General: Skin is warm and dry  Neurological:      General: No focal deficit present  Mental Status: He is alert     Psychiatric:         Mood and Affect: Mood normal        Calos Sellers DO

## 2023-03-07 NOTE — PATIENT INSTRUCTIONS
Complete blood work today  Follow a low-fat low-cholesterol diet  Follow with Dr Tyron Rodriguez if needed for pilonidal cyst  Return in 1 year for office visit blood work sooner if needed

## 2023-04-24 ENCOUNTER — OFFICE VISIT (OUTPATIENT)
Dept: URGENT CARE | Facility: MEDICAL CENTER | Age: 23
End: 2023-04-24

## 2023-04-24 VITALS
TEMPERATURE: 97 F | RESPIRATION RATE: 20 BRPM | OXYGEN SATURATION: 98 % | DIASTOLIC BLOOD PRESSURE: 76 MMHG | HEART RATE: 78 BPM | SYSTOLIC BLOOD PRESSURE: 118 MMHG

## 2023-04-24 DIAGNOSIS — J30.9 ALLERGIC RHINITIS, UNSPECIFIED SEASONALITY, UNSPECIFIED TRIGGER: ICD-10-CM

## 2023-04-24 DIAGNOSIS — J02.9 SORE THROAT: Primary | ICD-10-CM

## 2023-04-24 LAB — S PYO AG THROAT QL: NEGATIVE

## 2023-04-24 RX ORDER — LIDOCAINE HYDROCHLORIDE 20 MG/ML
15 SOLUTION OROPHARYNGEAL 4 TIMES DAILY PRN
Qty: 100 ML | Refills: 0 | Status: SHIPPED | OUTPATIENT
Start: 2023-04-24

## 2023-04-24 NOTE — PATIENT INSTRUCTIONS
Rapid strep test negative  Th    Allergic Rhinitis   WHAT YOU NEED TO KNOW:   Allergic rhinitis, or hay fever, is swelling of the inside of your nose  The swelling is a reaction to allergens in the air  An allergen can be anything that causes an allergic reaction  Allergies to weeds, grass, trees, or mold often cause seasonal allergic rhinitis  Indoor dust mites, cockroaches, pet dander, or mold can also cause allergic rhinitis  DISCHARGE INSTRUCTIONS:   Call 911 for the following: You have chest pain or shortness of breath  Return to the emergency department if:   You have severe pain  You cough up blood  Contact your healthcare provider if:   You have a fever  You have ear or sinus pain, or a headache  Your symptoms get worse, even after treatment  You have yellow, green, brown, or bloody mucus coming from your nose  Your nose is bleeding or you have pain inside your nose  You have trouble sleeping because of your symptoms  You have questions or concerns about your condition or care  Medicines:   Medicines  help decrease your symptoms and clear your stuffy nose  Take your medicine as directed  Contact your healthcare provider if you think your medicine is not helping or if you have side effects  Tell him of her if you are allergic to any medicine  Keep a list of the medicines, vitamins, and herbs you take  Include the amounts, and when and why you take them  Bring the list or the pill bottles to follow-up visits  Carry your medicine list with you in case of an emergency  How to manage allergic rhinitis:  The best way to manage allergic rhinitis is to avoid allergens that can trigger your symptoms  Any of the following may help decrease your symptoms:  Rinse your nose and sinuses  with a salt water solution or use a salt water nasal spray  This will help thin the mucus in your nose and rinse away pollen and dirt   It will also help reduce swelling so you can breathe normally  Ask your healthcare provider how often to rinse your nose  Reduce exposure to dust mites  Wash sheets and towels in hot water every week  Cover your pillows and mattresses with allergen-free covers  Limit the number of stuffed animals and soft toys your child has  Wash your child's toys in hot water regularly  Vacuum weekly and use a vacuum  with an air filter  If possible, get rid of carpets and curtains  These collect dust and dust mites  Reduce exposure to pollen  Keep windows and doors closed in your house and car  Stay inside when air pollution or the pollen count is high  Run your air conditioner on recycle, and change air filters often  Shower and wash your hair before bed every night to rinse away pollen  Reduce exposure to pet dander  If possible, do not keep cats, dogs, birds, or other pets  If you do keep pets in your home, keep them out of bedrooms and carpeted rooms  Bathe them often  Reduce exposure to mold  Do not spend time in basements  Choose artificial plants instead of live plants  Keep your home's humidity at less than 45%  Do not have ponds or standing water in your home or yard  Do not smoke  Avoid others who smoke  Ask your healthcare provider for information if you currently smoke and need help to quit  Follow up with your healthcare provider as directed: You may need to see an allergist often to control your symptoms  Write down your questions so you remember to ask them during your visits  © Copyright [employer] 2022 Information is for End User's use only and may not be sold, redistributed or otherwise used for commercial purposes  All illustrations and images included in CareNotes® are the copyrighted property of A D A "[employer]  or [employer]  The above information is an  only  It is not intended as medical advice for individual conditions or treatments   Talk to your doctor, nurse or pharmacist before following any medical regimen to see if it is safe and effective for you   roat culture sent  Patient started empirically on Xylocaine Viscous as needed for sore throat every 6 hours as needed  I advised patient to continue with Girish Zhou

## 2023-04-24 NOTE — PROGRESS NOTES
[facility]        NAME: Aura Browne is a 24 y o  male  : 2000    MRN: [id number]  DATE: 2023  TIME: 1:44 PM    Assessment and Plan   Sore throat [J02 9]  1  Sore throat  POCT rapid strepA    Throat culture    Lidocaine Viscous HCl (XYLOCAINE) 2 % mucosal solution      2  Allergic rhinitis, unspecified seasonality, unspecified trigger              Patient Instructions       Follow up with PCP in 3-5 days  Proceed to  ER if symptoms worsen  Chief Complaint     Chief Complaint   Patient presents with   • Sore Throat     Pt C/O sore throat with post nasal drip that started about a week ago and increased throat discomfort Friday  History of Present Illness       23-year-old male here today with 5-day history of sore throat  Describes it as difficult to swallow  Denies any fever  Also complaining nasal congestion, rhinorrhea, occasional sneezing  He believes he has some postnasal drip  He question whether his symptoms were related to allergies which she has had in the past   Recently, he started Allegra which seemed to alleviate some of the symptoms  In the last 24 hours he has started DayQuil with more improvement  Denies any recent sick contact  Denies headache or body aches  Review of Systems   Review of Systems   Constitutional: Negative  HENT: Positive for congestion, postnasal drip, rhinorrhea, sneezing and sore throat  Respiratory: Positive for cough            Current Medications       Current Outpatient Medications:   •  fexofenadine (ALLEGRA) 180 MG tablet, Take 180 mg by mouth daily as needed, Disp: , Rfl:   •  Lidocaine Viscous HCl (XYLOCAINE) 2 % mucosal solution, Swish and spit 15 mL 4 (four) times a day as needed for mouth pain or discomfort, Disp: 100 mL, Rfl: 0  •  Omega-3 Fatty Acids (fish oil) 1,000 mg, Take 3,000 mg by mouth daily, Disp: , Rfl:     Current Allergies     Allergies as of 2023   • (No Known Allergies)            The following portions of the patient's history were reviewed and updated as appropriate: allergies, current medications, past family history, past medical history, past social history, past surgical history and problem list      No past medical history on file  No past surgical history on file  Family History   Problem Relation Age of Onset   • No Known Problems Mother    • Hyperlipidemia Father          Medications have been verified  Objective   /76 (BP Location: Right arm, Patient Position: Sitting, Cuff Size: Standard)   Pulse 78   Temp (!) 97 °F (36 1 °C) (Tympanic)   Resp 20   SpO2 98%   No LMP for male patient  Physical Exam     Physical Exam  Vitals and nursing note reviewed  Constitutional:       Appearance: He is well-developed  HENT:      Nose:      Comments: Hypertrophic boggy right turbinate     Mouth/Throat:      Comments: Cobblestoning observed in the posterior pharynx  No exudates are visualized  Pulmonary:      Effort: Pulmonary effort is normal       Breath sounds: Normal breath sounds  Musculoskeletal:      Cervical back: Normal range of motion and neck supple  Neurological:      Mental Status: He is alert

## 2023-04-26 LAB — BACTERIA THROAT CULT: NORMAL

## 2023-04-27 ENCOUNTER — OFFICE VISIT (OUTPATIENT)
Dept: FAMILY MEDICINE CLINIC | Facility: CLINIC | Age: 23
End: 2023-04-27

## 2023-04-27 VITALS
DIASTOLIC BLOOD PRESSURE: 70 MMHG | HEART RATE: 85 BPM | RESPIRATION RATE: 16 BRPM | BODY MASS INDEX: 25.3 KG/M2 | SYSTOLIC BLOOD PRESSURE: 116 MMHG | TEMPERATURE: 98.4 F | HEIGHT: 69 IN | WEIGHT: 170.8 LBS

## 2023-04-27 DIAGNOSIS — J30.9 ALLERGIC RHINITIS, UNSPECIFIED SEASONALITY, UNSPECIFIED TRIGGER: Primary | ICD-10-CM

## 2023-04-27 DIAGNOSIS — J45.20 MILD INTERMITTENT ASTHMA WITHOUT COMPLICATION: ICD-10-CM

## 2023-04-27 RX ORDER — ALBUTEROL SULFATE 90 UG/1
2 AEROSOL, METERED RESPIRATORY (INHALATION) EVERY 4 HOURS PRN
Qty: 54 G | Refills: 0 | Status: SHIPPED | OUTPATIENT
Start: 2023-04-27

## 2023-04-27 RX ORDER — METHYLPREDNISOLONE 4 MG/1
TABLET ORAL
Qty: 21 EACH | Refills: 0 | Status: SHIPPED | OUTPATIENT
Start: 2023-04-27

## 2023-04-27 RX ORDER — MONTELUKAST SODIUM 10 MG/1
10 TABLET ORAL
Qty: 90 TABLET | Refills: 1 | Status: SHIPPED | OUTPATIENT
Start: 2023-04-27

## 2023-04-27 RX ORDER — AZELASTINE 1 MG/ML
1 SPRAY, METERED NASAL 2 TIMES DAILY
Qty: 1 ML | Refills: 3 | Status: SHIPPED | OUTPATIENT
Start: 2023-04-27

## 2023-04-27 RX ORDER — ALBUTEROL SULFATE 90 UG/1
2 AEROSOL, METERED RESPIRATORY (INHALATION) EVERY 4 HOURS PRN
Qty: 18 G | Refills: 0 | Status: SHIPPED | OUTPATIENT
Start: 2023-04-27 | End: 2023-04-27

## 2023-04-27 NOTE — PROGRESS NOTES
Name: Yony Mcnulty      : 2000      MRN: [id number]  Encounter Provider: ANISH Moon  Encounter Date: 2023   Encounter department: [place] [address]     1  Allergic rhinitis, unspecified seasonality, unspecified trigger  -     azelastine (ASTELIN) 0 1 % nasal spray; 1 spray into each nostril 2 (two) times a day Use in each nostril as directed    2  Mild intermittent asthma without complication  Assessment & Plan:  Patient to restart singulair  Will prescribe ventolin as needed     Orders:  -     montelukast (SINGULAIR) 10 mg tablet; Take 1 tablet (10 mg total) by mouth daily at bedtime  -     methylPREDNISolone 4 MG tablet therapy pack; Use as directed on package  -     albuterol (Ventolin HFA) 90 mcg/act inhaler; Inhale 2 puffs every 4 (four) hours as needed for wheezing or shortness of breath           Subjective      Patient reports his most concerning symptoms is his inability to be as physically active due to his shortness of breath and cough  He was seen on on  for sore throat at urgent care  Rapid strep was negative and subsequent throat culture was also negative  It has been a little over 10 days since his symptoms started  Patient does have a history of asthma has not had issues with his asthma in a really long time  And at his new home with cleaning up carpet has been difficult with cat dander and pollen has been higher and affecting his breathing     Cough  This is a new problem  The current episode started 1 to 4 weeks ago  The problem has been gradually worsening  The problem occurs constantly  The cough is productive of sputum  Associated symptoms include chills, headaches, myalgias, nasal congestion, postnasal drip, rhinorrhea, a sore throat and shortness of breath  Pertinent negatives include no chest pain, ear congestion, ear pain, fever, heartburn, hemoptysis, rash, sweats, weight loss or wheezing   The symptoms are aggravated "by dust and pollens  Review of Systems   Constitutional: Positive for chills  Negative for fever and weight loss  HENT: Positive for postnasal drip, rhinorrhea and sore throat  Negative for ear pain  Respiratory: Positive for cough and shortness of breath  Negative for hemoptysis and wheezing  Cardiovascular: Negative for chest pain  Gastrointestinal: Negative for heartburn  Musculoskeletal: Positive for myalgias  Skin: Negative for rash  Neurological: Positive for headaches  Current Outpatient Medications on File Prior to Visit   Medication Sig   • fexofenadine (ALLEGRA) 180 MG tablet Take 180 mg by mouth daily as needed   • Omega-3 Fatty Acids (fish oil) 1,000 mg Take 3,000 mg by mouth daily   • Lidocaine Viscous HCl (XYLOCAINE) 2 % mucosal solution Swish and spit 15 mL 4 (four) times a day as needed for mouth pain or discomfort (Patient not taking: Reported on 4/27/2023)       Objective     /70 (BP Location: Right arm, Patient Position: Sitting, Cuff Size: Adult)   Pulse 85   Temp 98 4 °F (36 9 °C) (Temporal)   Resp 16   Ht 5' 9\" (1 753 m)   Wt 77 5 kg (170 lb 12 8 oz)   BMI 25 22 kg/m²     Physical Exam  Vitals and nursing note reviewed  Constitutional:       General: He is not in acute distress  Appearance: Normal appearance  He is well-developed  He is not ill-appearing, toxic-appearing or diaphoretic  HENT:      Head: Normocephalic and atraumatic  Right Ear: Tympanic membrane normal       Nose: Rhinorrhea present  Mouth/Throat:      Mouth: Mucous membranes are moist       Pharynx: Posterior oropharyngeal erythema (cobbelstoning ) present  Eyes:      Extraocular Movements: Extraocular movements intact  Conjunctiva/sclera: Conjunctivae normal       Pupils: Pupils are equal    Cardiovascular:      Rate and Rhythm: Normal rate and regular rhythm  Heart sounds: S1 normal and S2 normal  No murmur heard    Pulmonary:      Effort: Pulmonary effort is " normal  No respiratory distress  Breath sounds: Normal breath sounds  Lymphadenopathy:      Cervical: No cervical adenopathy  Neurological:      Mental Status: He is alert and oriented to person, place, and time  Psychiatric:         Mood and Affect: Mood normal          Thought Content:  Thought content normal           Verline Cardinal

## 2023-07-20 ENCOUNTER — OFFICE VISIT (OUTPATIENT)
Dept: FAMILY MEDICINE CLINIC | Facility: CLINIC | Age: 23
End: 2023-07-20
Payer: COMMERCIAL

## 2023-07-20 VITALS
BODY MASS INDEX: 24.88 KG/M2 | WEIGHT: 168.5 LBS | DIASTOLIC BLOOD PRESSURE: 64 MMHG | HEART RATE: 63 BPM | OXYGEN SATURATION: 95 % | SYSTOLIC BLOOD PRESSURE: 124 MMHG | TEMPERATURE: 96.5 F

## 2023-07-20 DIAGNOSIS — F40.10 SOCIAL ANXIETY DISORDER: ICD-10-CM

## 2023-07-20 DIAGNOSIS — E78.00 PURE HYPERCHOLESTEROLEMIA: ICD-10-CM

## 2023-07-20 DIAGNOSIS — R00.2 PALPITATIONS: ICD-10-CM

## 2023-07-20 DIAGNOSIS — J30.9 ALLERGIC RHINITIS, UNSPECIFIED SEASONALITY, UNSPECIFIED TRIGGER: ICD-10-CM

## 2023-07-20 DIAGNOSIS — E66.3 OVERWEIGHT (BMI 25.0-29.9): ICD-10-CM

## 2023-07-20 DIAGNOSIS — J45.20 MILD INTERMITTENT ASTHMA WITHOUT COMPLICATION: ICD-10-CM

## 2023-07-20 DIAGNOSIS — H69.80 DYSFUNCTION OF EUSTACHIAN TUBE, UNSPECIFIED LATERALITY: ICD-10-CM

## 2023-07-20 DIAGNOSIS — R07.9 CHEST PAIN, UNSPECIFIED TYPE: Primary | ICD-10-CM

## 2023-07-20 PROCEDURE — 99214 OFFICE O/P EST MOD 30 MIN: CPT | Performed by: FAMILY MEDICINE

## 2023-07-20 PROCEDURE — 93000 ELECTROCARDIOGRAM COMPLETE: CPT | Performed by: FAMILY MEDICINE

## 2023-07-20 RX ORDER — HYDROXYZINE HYDROCHLORIDE 10 MG/1
TABLET, FILM COATED ORAL
Qty: 30 TABLET | Refills: 0 | Status: SHIPPED | OUTPATIENT
Start: 2023-07-20

## 2023-07-20 NOTE — PATIENT INSTRUCTIONS
Pleat stress echocardiogram  Complete chest x-ray  Use hydroxyzine twice daily as needed for anxiety.   Do not drive or operate machinery until side effect is known this will cause drowsiness  Continue other medications as ordered  Recheck 2 weeks  If symptoms worsen report to ER

## 2023-07-20 NOTE — PROGRESS NOTES
Name: Zari Perez      : 2000      MRN: [id number]  Encounter Provider: Kirstie Anderson DO  Encounter Date: 2023   Encounter department: [facility] PRIMARY [facility]    Assessment & Plan     1. Chest pain  2. Palpitation  EKG is normal in NSR  Stress echo ordered  Chest x-ray ordered  3. Pure allergic rhinitis/eustachian tube dysfunction  Patient to continue Allegra, Singulair, and Astelin  4. Asthma, stable lungs are clear  5. Social anxiety disorder, hydroxyzine as ordered drowsiness discussed. I believe this is the cause of his initial complaints  6. BMI now 24.88 patient is no longer overweight  7. Hypercholesterolemia patient is using fish oil and low-fat diet  8. Recheck 2 weeks sooner if needed      1. Chest pain, unspecified type  -     POCT ECG  -     XR chest pa & lateral; Future; Expected date: 2023  -     Echo stress test, exercise; Future; Expected date: 2023    2. Palpitations  Assessment & Plan:  Was seen by cardiology in the past, Dr. Frances Alejandra, EKG is NSR    Orders:  -     XR chest pa & lateral; Future; Expected date: 2023  -     Echo stress test, exercise; Future; Expected date: 2023    3. Allergic rhinitis, unspecified seasonality, unspecified trigger  Assessment & Plan:  Continue Singulair, Allegra and Astelin      4. Mild intermittent asthma without complication  Assessment & Plan:  Lungs are clear use albuterol as needed      5. Dysfunction of Eustachian tube, unspecified laterality  Assessment & Plan:  As above      6. Social anxiety disorder  Assessment & Plan: Will prescribe hydroxyzine as needed drowsiness discussed    Orders:  -     hydrOXYzine HCL (ATARAX) 10 mg tablet; Take 1 tablet twice daily as needed for anxiety    7. Overweight (BMI 25.0-29. 9)  Assessment & Plan:  Resolved BMI now 24.88      8.  Pure hypercholesterolemia  Assessment & Plan:  Low-fat diet and fish oil are being used             Subjective      For the past week or 2 patient had vague chest pain off-and-on few times daily. Denies any shortness of breath nausea vomiting or palpitations. Does feel anxious. Has noted some increasing nasal congestion the past few days. However his Astelin nasal spray appears to be relieving the symptoms    Review of Systems   Constitutional: Negative for chills, fatigue and fever. HENT:        HPI   Eyes: Negative. Respiratory: Negative. Cardiovascular:        HPI   Gastrointestinal: Negative. Endocrine: Negative. Genitourinary: Negative. Musculoskeletal: Negative. Skin: Negative. Allergic/Immunologic: Negative. Neurological: Negative. Hematological: Negative. Psychiatric/Behavioral:        HPI       Current Outpatient Medications on File Prior to Visit   Medication Sig   • albuterol (Ventolin HFA) 90 mcg/act inhaler Inhale 2 puffs every 4 (four) hours as needed for wheezing or shortness of breath   • azelastine (ASTELIN) 0.1 % nasal spray 1 spray into each nostril 2 (two) times a day Use in each nostril as directed   • fexofenadine (ALLEGRA) 180 MG tablet Take 180 mg by mouth daily as needed   • montelukast (SINGULAIR) 10 mg tablet Take 1 tablet (10 mg total) by mouth daily at bedtime   • Omega-3 Fatty Acids (fish oil) 1,000 mg Take 3,000 mg by mouth daily   • [DISCONTINUED] Lidocaine Viscous HCl (XYLOCAINE) 2 % mucosal solution SWISH AND SPIT 15 ML 4 TIMES A DAY AS NEEDED FOR MOUTH PAIN OR DISCOMFORT (Patient not taking: Reported on 7/20/2023)   • [DISCONTINUED] methylPREDNISolone 4 MG tablet therapy pack Use as directed on package (Patient not taking: Reported on 7/20/2023)       Objective     /64 (BP Location: Left arm, Patient Position: Sitting, Cuff Size: Standard)   Pulse 63   Temp (!) 96.5 °F (35.8 °C) (Temporal)   Wt 76.4 kg (168 lb 8 oz)   SpO2 95%   BMI 24.88 kg/m²     Physical Exam  Vitals and nursing note reviewed. Constitutional:       Appearance: Normal appearance.    HENT: Head: Normocephalic and atraumatic. Right Ear: Tympanic membrane normal.      Left Ear: Tympanic membrane normal.      Nose:      Comments: Mildly allergic turbinates     Mouth/Throat:      Mouth: Mucous membranes are moist.      Pharynx: Oropharynx is clear. No oropharyngeal exudate or posterior oropharyngeal erythema. Eyes:      General: No scleral icterus. Neck:      Vascular: No carotid bruit. Cardiovascular:      Rate and Rhythm: Normal rate and regular rhythm. Heart sounds: Normal heart sounds. Pulmonary:      Effort: Pulmonary effort is normal.      Breath sounds: Normal breath sounds. Abdominal:      General: Bowel sounds are normal.      Palpations: Abdomen is soft. Tenderness: There is no abdominal tenderness. Musculoskeletal:      Cervical back: Neck supple. Right lower leg: No edema. Left lower leg: No edema. Lymphadenopathy:      Cervical: No cervical adenopathy. Skin:     General: Skin is warm and dry. Neurological:      General: No focal deficit present. Mental Status: He is alert.    Psychiatric:      Comments: Mildly anxious       Calos Sellers DO

## 2023-07-24 ENCOUNTER — TELEPHONE (OUTPATIENT)
Dept: FAMILY MEDICINE CLINIC | Facility: CLINIC | Age: 23
End: 2023-07-24

## 2023-07-24 DIAGNOSIS — R07.9 CHEST PAIN, UNSPECIFIED TYPE: Primary | ICD-10-CM

## 2023-07-24 DIAGNOSIS — R00.2 PALPITATIONS: ICD-10-CM

## 2023-07-24 NOTE — TELEPHONE ENCOUNTER
Notify patient stress echocardiogram was not approved by his insurance.   I am ordering an exercise stress test and separate echocardiogram

## 2023-07-24 NOTE — TELEPHONE ENCOUNTER
The prior authorization request for ECHO stress test, exercise has not been approved. You can schedule a peer to peer by calling [facility] at phone # [phone] with the tracking # [id number]. The insurance determined the following:     [x] Does not meet Medical Necessity   [ ] No prior imaging   [ ] PT or conservative treatment incomplete   [ ] [employer]   [ ] Frequency     Denial Rationale: The following notes were requested but have not been sent: a doctor's note with a reason why a heart test where you walk (Exercise Stress Test) without heart pictures cannot be done. Clinicals uploaded:   Office notes   Labs     Patient is scheduled for 8/4     If you choose not to complete a peer to peer then please reply to this message to let us know. Please notify your patient and contact central scheduling by calling [phone] to cancel the appointment and cancel the order in Epic.      Thank you,   [place]

## 2023-07-25 NOTE — TELEPHONE ENCOUNTER
I called pt and notified of Dr. Ring President recommendation and he said he will think about and schedule when ready

## 2023-08-01 ENCOUNTER — RA CDI HCC (OUTPATIENT)
Dept: OTHER | Facility: HOSPITAL | Age: 23
End: 2023-08-01

## 2023-08-01 NOTE — PROGRESS NOTES
[facility] coding opportunities       Chart reviewed, no opportunity found: CHART REVIEWED, NO OPPORTUNITY FOUND        Patients Insurance        Commercial Insurance: [employer]

## 2024-03-07 ENCOUNTER — APPOINTMENT (OUTPATIENT)
Dept: LAB | Facility: CLINIC | Age: 24
End: 2024-03-07
Payer: COMMERCIAL

## 2024-03-07 DIAGNOSIS — E66.3 OVERWEIGHT (BMI 25.0-29.9): ICD-10-CM

## 2024-03-07 DIAGNOSIS — H69.90 DYSFUNCTION OF EUSTACHIAN TUBE, UNSPECIFIED LATERALITY: ICD-10-CM

## 2024-03-07 DIAGNOSIS — F40.10 SOCIAL ANXIETY DISORDER: ICD-10-CM

## 2024-03-07 DIAGNOSIS — E78.00 PURE HYPERCHOLESTEROLEMIA: ICD-10-CM

## 2024-03-07 DIAGNOSIS — J30.9 ALLERGIC RHINITIS, UNSPECIFIED SEASONALITY, UNSPECIFIED TRIGGER: ICD-10-CM

## 2024-03-07 DIAGNOSIS — L05.91 PILONIDAL CYST OF NATAL CLEFT: ICD-10-CM

## 2024-03-07 DIAGNOSIS — R00.2 PALPITATIONS: ICD-10-CM

## 2024-03-07 LAB
ALBUMIN SERPL BCP-MCNC: 4.5 G/DL (ref 3.5–5)
ALP SERPL-CCNC: 70 U/L (ref 34–104)
ALT SERPL W P-5'-P-CCNC: 27 U/L (ref 7–52)
ANION GAP SERPL CALCULATED.3IONS-SCNC: 6 MMOL/L
AST SERPL W P-5'-P-CCNC: 31 U/L (ref 13–39)
BILIRUB SERPL-MCNC: 0.81 MG/DL (ref 0.2–1)
BILIRUB UR QL STRIP: NEGATIVE
BUN SERPL-MCNC: 19 MG/DL (ref 5–25)
CALCIUM SERPL-MCNC: 9.3 MG/DL (ref 8.4–10.2)
CHLORIDE SERPL-SCNC: 103 MMOL/L (ref 96–108)
CHOLEST SERPL-MCNC: 183 MG/DL
CLARITY UR: CLEAR
CO2 SERPL-SCNC: 31 MMOL/L (ref 21–32)
COLOR UR: NORMAL
CREAT SERPL-MCNC: 1.13 MG/DL (ref 0.6–1.3)
ERYTHROCYTE [DISTWIDTH] IN BLOOD BY AUTOMATED COUNT: 11.9 % (ref 11.6–15.1)
GFR SERPL CREATININE-BSD FRML MDRD: 90 ML/MIN/1.73SQ M
GLUCOSE P FAST SERPL-MCNC: 81 MG/DL (ref 65–99)
GLUCOSE UR STRIP-MCNC: NEGATIVE MG/DL
HCT VFR BLD AUTO: 44.2 % (ref 36.5–49.3)
HDLC SERPL-MCNC: 61 MG/DL
HGB BLD-MCNC: 14.5 G/DL (ref 12–17)
HGB UR QL STRIP.AUTO: NEGATIVE
KETONES UR STRIP-MCNC: NEGATIVE MG/DL
LDLC SERPL CALC-MCNC: 105 MG/DL (ref 0–100)
LEUKOCYTE ESTERASE UR QL STRIP: NEGATIVE
MCH RBC QN AUTO: 30.3 PG (ref 26.8–34.3)
MCHC RBC AUTO-ENTMCNC: 32.8 G/DL (ref 31.4–37.4)
MCV RBC AUTO: 93 FL (ref 82–98)
NITRITE UR QL STRIP: NEGATIVE
PH UR STRIP.AUTO: 6 [PH]
PLATELET # BLD AUTO: 178 THOUSANDS/UL (ref 149–390)
PMV BLD AUTO: 12 FL (ref 8.9–12.7)
POTASSIUM SERPL-SCNC: 4 MMOL/L (ref 3.5–5.3)
PROT SERPL-MCNC: 7 G/DL (ref 6.4–8.4)
PROT UR STRIP-MCNC: NEGATIVE MG/DL
RBC # BLD AUTO: 4.78 MILLION/UL (ref 3.88–5.62)
SODIUM SERPL-SCNC: 140 MMOL/L (ref 135–147)
SP GR UR STRIP.AUTO: 1.02 (ref 1–1.03)
TRIGL SERPL-MCNC: 87 MG/DL
TSH SERPL DL<=0.05 MIU/L-ACNC: 1.92 UIU/ML (ref 0.45–4.5)
UROBILINOGEN UR STRIP-ACNC: <2 MG/DL
WBC # BLD AUTO: 3.54 THOUSAND/UL (ref 4.31–10.16)

## 2024-03-07 PROCEDURE — 36415 COLL VENOUS BLD VENIPUNCTURE: CPT

## 2024-03-07 PROCEDURE — 85027 COMPLETE CBC AUTOMATED: CPT

## 2024-03-07 PROCEDURE — 81003 URINALYSIS AUTO W/O SCOPE: CPT

## 2024-03-07 PROCEDURE — 80061 LIPID PANEL: CPT

## 2024-03-07 PROCEDURE — 80053 COMPREHEN METABOLIC PANEL: CPT

## 2024-03-07 PROCEDURE — 84443 ASSAY THYROID STIM HORMONE: CPT

## 2024-03-19 ENCOUNTER — RA CDI HCC (OUTPATIENT)
Dept: OTHER | Facility: HOSPITAL | Age: 24
End: 2024-03-19

## 2024-03-19 NOTE — PROGRESS NOTES
HCC coding opportunities          Chart Reviewed number of suggestions sent to Provider: 1   J45.20    This is a reminder to address (resolve/update/assess) ALL HCC (risk adjustment) codes as found on active problem list for 2024 as patient scores reset to zero KAY.  Also, just a reminder to please review and assess all other chronic conditions for 2024  Patients Insurance        Commercial Insurance: [employer] Commercial [employer]

## 2024-03-26 ENCOUNTER — OFFICE VISIT (OUTPATIENT)
Dept: FAMILY MEDICINE CLINIC | Facility: CLINIC | Age: 24
End: 2024-03-26
Payer: COMMERCIAL

## 2024-03-26 VITALS
DIASTOLIC BLOOD PRESSURE: 86 MMHG | HEIGHT: 69 IN | OXYGEN SATURATION: 97 % | RESPIRATION RATE: 16 BRPM | BODY MASS INDEX: 25.33 KG/M2 | HEART RATE: 81 BPM | WEIGHT: 171 LBS | SYSTOLIC BLOOD PRESSURE: 122 MMHG

## 2024-03-26 DIAGNOSIS — J45.20 MILD INTERMITTENT ASTHMA WITHOUT COMPLICATION: Primary | ICD-10-CM

## 2024-03-26 DIAGNOSIS — E78.00 PURE HYPERCHOLESTEROLEMIA: ICD-10-CM

## 2024-03-26 DIAGNOSIS — R00.2 PALPITATIONS: ICD-10-CM

## 2024-03-26 DIAGNOSIS — J30.9 ALLERGIC RHINITIS, UNSPECIFIED SEASONALITY, UNSPECIFIED TRIGGER: ICD-10-CM

## 2024-03-26 DIAGNOSIS — F40.10 SOCIAL ANXIETY DISORDER: ICD-10-CM

## 2024-03-26 DIAGNOSIS — D72.819 LEUKOPENIA, UNSPECIFIED TYPE: ICD-10-CM

## 2024-03-26 DIAGNOSIS — H69.90 DYSFUNCTION OF EUSTACHIAN TUBE, UNSPECIFIED LATERALITY: ICD-10-CM

## 2024-03-26 DIAGNOSIS — D49.2 SKIN NEOPLASM: ICD-10-CM

## 2024-03-26 PROBLEM — L05.91 PILONIDAL CYST OF NATAL CLEFT: Status: RESOLVED | Noted: 2022-09-13 | Resolved: 2024-03-26

## 2024-03-26 PROCEDURE — 99214 OFFICE O/P EST MOD 30 MIN: CPT | Performed by: FAMILY MEDICINE

## 2024-03-26 NOTE — PATIENT INSTRUCTIONS
Continue present therapy  Follow low-cholesterol diet  Follow with dermatology for evaluation of skin lesion left forearm.  Skin cancer must be ruled out  Repeat CBC in 2 weeks, nonfasting  Complete pulmonary function testing for evaluation of your mild asthma  Return in 1 year for office visit and blood work sooner if needed

## 2024-03-26 NOTE — PROGRESS NOTES
Name: Bonifacio Liriano      : 2000      MRN: [id number]  Encounter Provider: Calos Sellers DO  Encounter Date: 3/26/2024   Encounter department: [facility]    Assessment & Plan     1.  Mild asthma, last albuterol use proxy 1 years ago, PFT ordered #2 for allergic rhinitis/eustachian tube dysfunction, stable continue present therapy  3.  Anxiety, stable using hydroxyzine as needed  4.  Palpitations currently asymptomatic was seen by cardiology in the past  5.  Pure hypercholesterolemia, controlled with diet and fish oil  6.  Leukopenia, mild repeat CBC with differential in 2 weeks  7.  Skin neoplasm ABCDs of skin cancer discussed with patient it is positive for several of these refer to dermatology for further evaluation definitive diagnosis and treatment  8.  Return in 1 year for office visit and blood work sooner if needed      1. Mild intermittent asthma without complication  Assessment & Plan:  Last use of albuterol proxy 1 year ago, PFT ordered    Orders:  -     Complete PFT with post bronchodilator; Future  -     CBC; Future; Expected date: 2025  -     Comprehensive metabolic panel; Future; Expected date: 2025  -     Lipid Panel with Direct LDL reflex; Future; Expected date: 2025  -     TSH, 3rd generation with Free T4 reflex; Future; Expected date: 2025  -     UA (URINE) with reflex to Scope; Future; Expected date: 2025    2. Allergic rhinitis, unspecified seasonality, unspecified trigger  Assessment & Plan:  Stable on Astelin, Allegra, and Singulair    Orders:  -     CBC; Future; Expected date: 2025  -     Comprehensive metabolic panel; Future; Expected date: 2025  -     Lipid Panel with Direct LDL reflex; Future; Expected date: 2025  -     TSH, 3rd generation with Free T4 reflex; Future; Expected date: 2025  -     UA (URINE) with reflex to Scope; Future; Expected date: 2025    3. Dysfunction of Eustachian tube,  unspecified laterality  Assessment & Plan:  As above    Orders:  -     CBC; Future; Expected date: 03/03/2025  -     Comprehensive metabolic panel; Future; Expected date: 03/03/2025  -     Lipid Panel with Direct LDL reflex; Future; Expected date: 03/03/2025  -     TSH, 3rd generation with Free T4 reflex; Future; Expected date: 03/03/2025  -     UA (URINE) with reflex to Scope; Future; Expected date: 03/03/2025    4. Social anxiety disorder  Assessment & Plan:  Stable on as needed hydroxyzine    Orders:  -     CBC; Future; Expected date: 03/03/2025  -     Comprehensive metabolic panel; Future; Expected date: 03/03/2025  -     Lipid Panel with Direct LDL reflex; Future; Expected date: 03/03/2025  -     TSH, 3rd generation with Free T4 reflex; Future; Expected date: 03/03/2025  -     UA (URINE) with reflex to Scope; Future; Expected date: 03/03/2025    5. Palpitations  Assessment & Plan:  Asymptomatic at the present time was evaluated by cardiology    Orders:  -     CBC; Future; Expected date: 03/03/2025  -     Comprehensive metabolic panel; Future; Expected date: 03/03/2025  -     Lipid Panel with Direct LDL reflex; Future; Expected date: 03/03/2025  -     TSH, 3rd generation with Free T4 reflex; Future; Expected date: 03/03/2025  -     UA (URINE) with reflex to Scope; Future; Expected date: 03/03/2025    6. Pure hypercholesterolemia  Assessment & Plan:  Controlled with diet and fish oil    Orders:  -     CBC; Future; Expected date: 03/03/2025  -     Comprehensive metabolic panel; Future; Expected date: 03/03/2025  -     Lipid Panel with Direct LDL reflex; Future; Expected date: 03/03/2025  -     TSH, 3rd generation with Free T4 reflex; Future; Expected date: 03/03/2025  -     UA (URINE) with reflex to Scope; Future; Expected date: 03/03/2025    7. Leukopenia, unspecified type  -     CBC and differential; Future; Expected date: 04/08/2024  -     CBC; Future; Expected date: 03/03/2025  -     Comprehensive metabolic  panel; Future; Expected date: 03/03/2025  -     Lipid Panel with Direct LDL reflex; Future; Expected date: 03/03/2025  -     TSH, 3rd generation with Free T4 reflex; Future; Expected date: 03/03/2025  -     UA (URINE) with reflex to Scope; Future; Expected date: 03/03/2025    8. Skin neoplasm  -     Ambulatory Referral to Dermatology; Future  -     CBC; Future; Expected date: 03/03/2025  -     Comprehensive metabolic panel; Future; Expected date: 03/03/2025  -     Lipid Panel with Direct LDL reflex; Future; Expected date: 03/03/2025  -     TSH, 3rd generation with Free T4 reflex; Future; Expected date: 03/03/2025  -     UA (URINE) with reflex to Scope; Future; Expected date: 03/03/2025         Subjective      Patient doing well without [facility] complaints or concerns at the present time except for a skin lesion left forearm he would like looked at.  Is been there for at least 2 years and seems to be getting slightly larger and now itches.  Blood work was reviewed.  Patient last used his albuterol approximately 1 year ago      Review of Systems   Constitutional: Negative.    HENT: Negative.     Eyes: Negative.    Respiratory:          HPI   Cardiovascular: Negative.    Gastrointestinal: Negative.    Endocrine: Negative.    Genitourinary: Negative.    Musculoskeletal: Negative.    Skin:         HPI   Allergic/Immunologic: Negative.    Neurological: Negative.    Hematological: Negative.    Psychiatric/Behavioral: Negative.         Current Outpatient Medications on File Prior to Visit   Medication Sig   • albuterol (Ventolin HFA) 90 mcg/act inhaler Inhale 2 puffs every 4 (four) hours as needed for wheezing or shortness of breath   • azelastine (ASTELIN) 0.1 % nasal spray 1 spray into each nostril 2 (two) times a day Use in each nostril as directed   • fexofenadine (ALLEGRA) 180 MG tablet Take 180 mg by mouth daily as needed   • hydrOXYzine HCL (ATARAX) 10 mg tablet Take 1 tablet twice daily as needed for anxiety   •  "montelukast (SINGULAIR) 10 mg tablet Take 1 tablet (10 mg total) by mouth daily at bedtime   • Omega-3 Fatty Acids (fish oil) 1,000 mg Take 3,000 mg by mouth daily       Objective     /86 (BP Location: Right arm, Patient Position: Sitting, Cuff Size: Large)   Pulse 81   Resp 16   Ht 5' 9\" (1.753 m)   Wt 77.6 kg (171 lb)   SpO2 97%   BMI 25.25 kg/m²     Physical Exam  Vitals and nursing note reviewed.   Constitutional:       Appearance: Normal appearance.   HENT:      Head: Normocephalic and atraumatic.      Right Ear: Tympanic membrane normal.      Left Ear: Tympanic membrane normal.      Nose: Nose normal.      Mouth/Throat:      Mouth: Mucous membranes are moist.      Pharynx: Oropharynx is clear. No oropharyngeal exudate or posterior oropharyngeal erythema.   Eyes:      General: No scleral icterus.  Neck:      Vascular: No carotid bruit.   Cardiovascular:      Rate and Rhythm: Normal rate and regular rhythm.      Heart sounds: Normal heart sounds.   Pulmonary:      Effort: Pulmonary effort is normal.      Breath sounds: Normal breath sounds.   Abdominal:      General: Bowel sounds are normal.      Palpations: Abdomen is soft.      Tenderness: There is no abdominal tenderness.   Musculoskeletal:      Cervical back: Neck supple.      Right lower leg: No edema.      Left lower leg: No edema.   Lymphadenopathy:      Cervical: No cervical adenopathy.   Skin:     General: Skin is warm and dry.      Findings: Lesion present.      Comments: Left forearm with a 0.4 x 0.4 cm very slightly raised reddish-tan lesion with central excoriation   Neurological:      Mental Status: He is alert.       Calos Sellers DO    "

## 2024-03-26 NOTE — PROGRESS NOTES
ADULT ANNUAL PHYSICAL  [facility] PRIMARY CARE    NAME: Bonifacio Liriano  AGE: 24 y.o. SEX: male  : 2000     DATE: 3/26/2024     Assessment and Plan:     Problem List Items Addressed This Visit    None      Immunizations and preventive care screenings were discussed with patient today. Appropriate education was printed on patient's after visit summary.    Counseling:  {Annual Physical; Counselin}         No follow-ups on file.     Chief Complaint:     Chief Complaint   Patient presents with   • Physical Exam      History of Present Illness:     Adult Annual Physical   Patient here for a comprehensive physical exam. The patient reports {problems:93249}.    Diet and Physical Activity  Diet/Nutrition: {annual physical; diet:80979004}.   Exercise: {annual physical; exercise:77133095}.      Depression Screening  PHQ-2/9 Depression Screening    Little interest or pleasure in doing things: 0 - not at all  Feeling down, depressed, or hopeless: 0 - not at all  PHQ-2 Score: 0  PHQ-2 Interpretation: Negative depression screen       General Health  Sleep: {annual physical; sleep:2102}.   Hearing: {annual physical; hearin}.  Vision: {annual physical; vision:72900335}.   Dental: {annual physical; dental:38450962}.        Health  History of STDs?: {yes/no:29424}.    Advanced Care Planning  Do you have an advanced directive? {YES/NO:}  Do you have a durable medical power of ? {YES/NO:}  ACP document given to the patient? {YES/NO:}     Review of Systems:     Review of Systems   Past Medical History:     No past medical history on file.   Past Surgical History:     No past surgical history on file.   Social History:     Social History     Socioeconomic History   • Marital status: /Civil Union     Spouse name: None   • Number of children: None   • Years of education: None   • Highest education level: None   Occupational History  "  • None   Tobacco Use   • Smoking status: Never   • Smokeless tobacco: Never   Vaping Use   • Vaping status: Never Used   Substance and Sexual Activity   • Alcohol use: Yes     Comment: very rarely   • Drug use: No   • Sexual activity: Yes   Other Topics Concern   • None   Social History Narrative   • None     Social Determinants of Health     Financial Resource Strain: Not on file   Food Insecurity: Not on file   Transportation Needs: Not on file   Physical Activity: Not on file   Stress: Not on file   Social Connections: Not on file   Intimate Partner Violence: Not on file   Housing Stability: Not on file      Family History:     Family History   Problem Relation Age of Onset   • No Known Problems Mother    • Hyperlipidemia Father       Current Medications:     Current Outpatient Medications   Medication Sig Dispense Refill   • albuterol (Ventolin HFA) 90 mcg/act inhaler Inhale 2 puffs every 4 (four) hours as needed for wheezing or shortness of breath 54 g 0   • azelastine (ASTELIN) 0.1 % nasal spray 1 spray into each nostril 2 (two) times a day Use in each nostril as directed 1 mL 3   • fexofenadine (ALLEGRA) 180 MG tablet Take 180 mg by mouth daily as needed     • hydrOXYzine HCL (ATARAX) 10 mg tablet Take 1 tablet twice daily as needed for anxiety 30 tablet 0   • montelukast (SINGULAIR) 10 mg tablet Take 1 tablet (10 mg total) by mouth daily at bedtime 90 tablet 1   • Omega-3 Fatty Acids (fish oil) 1,000 mg Take 3,000 mg by mouth daily       No current facility-administered medications for this visit.      Allergies:     No Known Allergies   Physical Exam:     /86 (BP Location: Right arm, Patient Position: Sitting, Cuff Size: Large)   Pulse 81   Resp 16   Ht 5' 9\" (1.753 m)   Wt 77.6 kg (171 lb)   SpO2 97%   BMI 25.25 kg/m²     Physical Exam     Calos Sellers,    [facility] PRIMARY [facility]    "

## 2024-04-09 ENCOUNTER — APPOINTMENT (OUTPATIENT)
Dept: LAB | Facility: CLINIC | Age: 24
End: 2024-04-09
Payer: COMMERCIAL

## 2024-04-09 DIAGNOSIS — D72.819 LEUKOPENIA, UNSPECIFIED TYPE: ICD-10-CM

## 2024-04-09 DIAGNOSIS — D72.819 LEUKOPENIA, UNSPECIFIED TYPE: Primary | ICD-10-CM

## 2024-04-09 LAB
BASOPHILS # BLD AUTO: 0.04 THOUSANDS/ÂΜL (ref 0–0.1)
BASOPHILS NFR BLD AUTO: 1 % (ref 0–1)
EOSINOPHIL # BLD AUTO: 0.09 THOUSAND/ÂΜL (ref 0–0.61)
EOSINOPHIL NFR BLD AUTO: 2 % (ref 0–6)
ERYTHROCYTE [DISTWIDTH] IN BLOOD BY AUTOMATED COUNT: 12 % (ref 11.6–15.1)
HCT VFR BLD AUTO: 44.8 % (ref 36.5–49.3)
HGB BLD-MCNC: 14.9 G/DL (ref 12–17)
IMM GRANULOCYTES # BLD AUTO: 0.01 THOUSAND/UL (ref 0–0.2)
IMM GRANULOCYTES NFR BLD AUTO: 0 % (ref 0–2)
LYMPHOCYTES # BLD AUTO: 1.9 THOUSANDS/ÂΜL (ref 0.6–4.47)
LYMPHOCYTES NFR BLD AUTO: 45 % (ref 14–44)
MCH RBC QN AUTO: 30.5 PG (ref 26.8–34.3)
MCHC RBC AUTO-ENTMCNC: 33.3 G/DL (ref 31.4–37.4)
MCV RBC AUTO: 92 FL (ref 82–98)
MONOCYTES # BLD AUTO: 0.42 THOUSAND/ÂΜL (ref 0.17–1.22)
MONOCYTES NFR BLD AUTO: 10 % (ref 4–12)
NEUTROPHILS # BLD AUTO: 1.8 THOUSANDS/ÂΜL (ref 1.85–7.62)
NEUTS SEG NFR BLD AUTO: 42 % (ref 43–75)
NRBC BLD AUTO-RTO: 0 /100 WBCS
PLATELET # BLD AUTO: 211 THOUSANDS/UL (ref 149–390)
PMV BLD AUTO: 11.6 FL (ref 8.9–12.7)
RBC # BLD AUTO: 4.88 MILLION/UL (ref 3.88–5.62)
WBC # BLD AUTO: 4.26 THOUSAND/UL (ref 4.31–10.16)

## 2024-04-09 PROCEDURE — 85025 COMPLETE CBC W/AUTO DIFF WBC: CPT

## 2024-04-09 PROCEDURE — 36415 COLL VENOUS BLD VENIPUNCTURE: CPT

## 2024-04-10 ENCOUNTER — TELEPHONE (OUTPATIENT)
Dept: FAMILY MEDICINE CLINIC | Facility: CLINIC | Age: 24
End: 2024-04-10

## 2024-04-10 ENCOUNTER — TELEPHONE (OUTPATIENT)
Dept: HEMATOLOGY ONCOLOGY | Facility: CLINIC | Age: 24
End: 2024-04-10

## 2024-04-10 DIAGNOSIS — Z83.2 FAMILY HISTORY OF BLOOD DISEASE: ICD-10-CM

## 2024-04-10 DIAGNOSIS — D72.819 LEUKOPENIA, UNSPECIFIED TYPE: Primary | ICD-10-CM

## 2024-04-10 NOTE — TELEPHONE ENCOUNTER
With a family history of blood cancer I am referring to hematology for evaluation.  The blood work abnormalities are very minor however, will obtain specialist opinion secondary to family history

## 2024-04-10 NOTE — TELEPHONE ENCOUNTER
Spouse, Madison calling for Bonifacio in response to a referral that was received for patient to establish care with Hematology.     Outreach was made to schedule a consultation.    A consultation was scheduled for patient during this call. Patient is scheduled on 5/23/24 at 11:00am with Ria PADILLA at [facility]

## 2024-04-10 NOTE — TELEPHONE ENCOUNTER
Regarding: FW: Test results   Contact: [phone]    ----- Message -----  From: Anthony Ratliff MA  Sent: 4/10/2024   9:11 AM EDT  To: [facility]  Subject: Test results                                     ----- Message from Anthony Ratliff MA sent at 4/10/2024  9:11 AM EDT -----       ----- Message from Bonifacio Liriano to Calos Sellers DO sent at 2024  7:47 PM -----   Hello, are there any tests that we can do in the meantime prior to the next CBC? My cousin  of blood cancer when she was around 29 so I’d like to be precautious and would rather have negative tests than worst case scenario.    thanks

## 2024-05-14 ENCOUNTER — HOSPITAL ENCOUNTER (OUTPATIENT)
Dept: PULMONOLOGY | Facility: HOSPITAL | Age: 24
Discharge: HOME | End: 2024-05-14
Payer: COMMERCIAL

## 2024-05-14 DIAGNOSIS — J45.20 MILD INTERMITTENT ASTHMA WITHOUT COMPLICATION: ICD-10-CM

## 2024-05-14 PROCEDURE — 94729 DIFFUSING CAPACITY: CPT | Performed by: INTERNAL MEDICINE

## 2024-05-14 PROCEDURE — 94760 N-INVAS EAR/PLS OXIMETRY 1: CPT

## 2024-05-14 PROCEDURE — 94060 EVALUATION OF WHEEZING: CPT

## 2024-05-14 PROCEDURE — 94729 DIFFUSING CAPACITY: CPT

## 2024-05-14 PROCEDURE — 94726 PLETHYSMOGRAPHY LUNG VOLUMES: CPT | Performed by: INTERNAL MEDICINE

## 2024-05-14 PROCEDURE — 94726 PLETHYSMOGRAPHY LUNG VOLUMES: CPT

## 2024-05-14 PROCEDURE — 94060 EVALUATION OF WHEEZING: CPT | Performed by: INTERNAL MEDICINE

## 2024-05-14 RX ORDER — ALBUTEROL SULFATE 2.5 MG/3ML
2.5 SOLUTION RESPIRATORY (INHALATION) ONCE AS NEEDED
Status: COMPLETED | OUTPATIENT
Start: 2024-05-14 | End: 2024-05-14

## 2024-05-14 RX ADMIN — ALBUTEROL SULFATE 2.5 MG: 2.5 SOLUTION RESPIRATORY (INHALATION) at 09:51

## 2024-05-20 ENCOUNTER — APPOINTMENT (OUTPATIENT)
Dept: LAB | Facility: CLINIC | Age: 24
End: 2024-05-20
Payer: COMMERCIAL

## 2024-05-20 DIAGNOSIS — D72.819 LEUKOPENIA, UNSPECIFIED TYPE: ICD-10-CM

## 2024-05-20 LAB
BASOPHILS # BLD AUTO: 0.04 THOUSANDS/ÂΜL (ref 0–0.1)
BASOPHILS NFR BLD AUTO: 1 % (ref 0–1)
EOSINOPHIL # BLD AUTO: 0.12 THOUSAND/ÂΜL (ref 0–0.61)
EOSINOPHIL NFR BLD AUTO: 3 % (ref 0–6)
ERYTHROCYTE [DISTWIDTH] IN BLOOD BY AUTOMATED COUNT: 11.9 % (ref 11.6–15.1)
HCT VFR BLD AUTO: 44.5 % (ref 36.5–49.3)
HGB BLD-MCNC: 14.8 G/DL (ref 12–17)
IMM GRANULOCYTES # BLD AUTO: 0.01 THOUSAND/UL (ref 0–0.2)
IMM GRANULOCYTES NFR BLD AUTO: 0 % (ref 0–2)
LYMPHOCYTES # BLD AUTO: 2.13 THOUSANDS/ÂΜL (ref 0.6–4.47)
LYMPHOCYTES NFR BLD AUTO: 46 % (ref 14–44)
MCH RBC QN AUTO: 30.8 PG (ref 26.8–34.3)
MCHC RBC AUTO-ENTMCNC: 33.3 G/DL (ref 31.4–37.4)
MCV RBC AUTO: 93 FL (ref 82–98)
MONOCYTES # BLD AUTO: 0.44 THOUSAND/ÂΜL (ref 0.17–1.22)
MONOCYTES NFR BLD AUTO: 9 % (ref 4–12)
NEUTROPHILS # BLD AUTO: 1.92 THOUSANDS/ÂΜL (ref 1.85–7.62)
NEUTS SEG NFR BLD AUTO: 41 % (ref 43–75)
NRBC BLD AUTO-RTO: 0 /100 WBCS
PLATELET # BLD AUTO: 199 THOUSANDS/UL (ref 149–390)
PMV BLD AUTO: 11.2 FL (ref 8.9–12.7)
RBC # BLD AUTO: 4.8 MILLION/UL (ref 3.88–5.62)
WBC # BLD AUTO: 4.66 THOUSAND/UL (ref 4.31–10.16)

## 2024-05-20 PROCEDURE — 85025 COMPLETE CBC W/AUTO DIFF WBC: CPT

## 2024-05-20 PROCEDURE — 36415 COLL VENOUS BLD VENIPUNCTURE: CPT

## 2024-05-23 ENCOUNTER — OFFICE VISIT (OUTPATIENT)
Dept: HEMATOLOGY ONCOLOGY | Facility: CLINIC | Age: 24
End: 2024-05-23
Payer: COMMERCIAL

## 2024-05-23 VITALS
OXYGEN SATURATION: 98 % | BODY MASS INDEX: 25.03 KG/M2 | DIASTOLIC BLOOD PRESSURE: 80 MMHG | WEIGHT: 169 LBS | SYSTOLIC BLOOD PRESSURE: 122 MMHG | HEART RATE: 87 BPM | HEIGHT: 69 IN | RESPIRATION RATE: 15 BRPM | TEMPERATURE: 98.4 F

## 2024-05-23 DIAGNOSIS — Z83.2 FAMILY HISTORY OF BLOOD DISEASE: ICD-10-CM

## 2024-05-23 DIAGNOSIS — D72.819 LEUKOPENIA, UNSPECIFIED TYPE: ICD-10-CM

## 2024-05-23 PROCEDURE — 99243 OFF/OP CNSLTJ NEW/EST LOW 30: CPT | Performed by: PHYSICIAN ASSISTANT

## 2024-05-23 NOTE — PROGRESS NOTES
[address] [facility] [place]  [address] [facility] [facility] [place] [address]  Hematology Ambulatory Consult  Bonifacio Liriano, 2000, [id number]  5/23/2024      Assessment and Plan   1. Leukopenia, unspecified type  Patient presents for evaluation of leukopenia.    His past medical history is significant for mild asthma, anxiety, high cholesterol.    He had lab work completed on 3/7/2024.  At that time WBC count was 3.54, no differential, hemoglobin 14.5 and platelets 178.    He had lab work repeated on 4/9/2024.  At that time WBC count was 4.26, lymphocytes were 45% (normal 14 to 44%), absolute lymphocyte count was normal, ANC was 1800.    Lab work was repeated again on 5/20/2024.  WBC count was normal at 4.66, lymphocytes 46%, absolute lymphocyte count was normal, hemoglobin normal at 14.8, platelets normal at 199.    The leukopenia has resolved.  He has relative lymphocytosis (his absolute lymphocyte count is normal).     We discussed both infectious and noninfectious causes of elevated lymphocyte count.  Infectious causes can include viral infections such as mononucleosis, certain bacterial infections, or parasitic infections which are seen most commonly in immunocompromised hosts. Non-infectious causes can include stress, asplenia, certain medications, or autoimmune disease recently in ongoing inflammation.  Rarely lymphocytosis can be related to primary hematologic issue like lymphoproliferative disorder.    Patient feels well and has no evidence of B-symptoms (fevers, nightsweats, weightloss).    I do not think that the current lab changes (mild) are of any clinical significance; however, will need to follow the lab work and establish a trend.  I suggested that he repeat lab work with us in 6 months time.     The patient is scheduled for follow-up in approximately 6 months.     Patient voiced agreement and understanding to the above.   Patient knows to call the  Hematology/Oncology office with any questions and concerns regarding the above.    Barrier(s) to care: None.   The patient is able to self care.    Subjective     Chief Complaint   Patient presents with    Follow-up    Consult     Leukopenia       Referring provider    Calos Sellers DO  [address]  [place] [address]    History of present illness:   Patient presents for evaluation of leukopenia.    His past medical history is significant for mild asthma, anxiety, high cholesterol.    He had routine lab work completed on 3/7/2024.  At that time WBC count was 3.54, no differential, hemoglobin 14.5 and platelets 178.    He had lab work repeated on 4/9/2024.  At that time WBC count was 4.26, lymphocytes were 45% (normal 14 to 44%), absolute lymphocyte count was normal, ANC was 1800.    Lab work was repeated again on 5/20/2024.  WBC count was normal at 4.66, lymphocytes 46%, absolute lymphocyte count was normal, hemoglobin normal at 14.8, platelets normal at 199.    Patient is feeling well.  He denies any B symptoms.  No unexplained weight loss, drenching night sweats, unexplained fevers or palpable adenopathy.  He denies nausea, vomiting, bowel changes, chest pain, shortness of breath.  He has no frequent illnesses or infections.  He has anxiety and asthma which is well-controlled on medications.    Review of Systems   Constitutional:  Negative for activity change, appetite change, fatigue and fever.   HENT:  Negative for nosebleeds.    Respiratory:  Negative for cough, choking and shortness of breath.    Cardiovascular:  Negative for chest pain, palpitations and leg swelling.   Gastrointestinal:  Negative for abdominal distention, abdominal pain, anal bleeding, blood in stool, constipation, diarrhea, nausea and vomiting.   Endocrine: Negative for cold intolerance.   Genitourinary:  Negative for hematuria.   Musculoskeletal:  Negative for myalgias.   Skin:  Negative for color change, pallor  and rash.   Allergic/Immunologic: Negative for immunocompromised state.   Neurological:  Negative for headaches.   Hematological:  Negative for adenopathy. Does not bruise/bleed easily.   All other systems reviewed and are negative.      No past medical history on file.  No past surgical history on file.  Family History   Problem Relation Age of Onset    No Known Problems Mother     Hyperlipidemia Father      Social History     Socioeconomic History    Marital status: /Civil Union     Spouse name: Not on file    Number of children: Not on file    Years of education: Not on file    Highest education level: Not on file   Occupational History    Not on file   Tobacco Use    Smoking status: Never    Smokeless tobacco: Never   Vaping Use    Vaping status: Never Used   Substance and Sexual Activity    Alcohol use: Yes     Comment: very rarely    Drug use: No    Sexual activity: Yes   Other Topics Concern    Not on file   Social History Narrative    Not on file     Social Determinants of Health     Financial Resource Strain: Not on file   Food Insecurity: Not on file   Transportation Needs: Not on file   Physical Activity: Not on file   Stress: Not on file   Social Connections: Not on file   Intimate Partner Violence: Not on file   Housing Stability: Not on file         Current Outpatient Medications:     albuterol (Ventolin HFA) 90 mcg/act inhaler, Inhale 2 puffs every 4 (four) hours as needed for wheezing or shortness of breath, Disp: 54 g, Rfl: 0    azelastine (ASTELIN) 0.1 % nasal spray, 1 spray into each nostril 2 (two) times a day Use in each nostril as directed, Disp: 1 mL, Rfl: 3    fexofenadine (ALLEGRA) 180 MG tablet, Take 180 mg by mouth daily as needed, Disp: , Rfl:     hydrOXYzine HCL (ATARAX) 10 mg tablet, Take 1 tablet twice daily as needed for anxiety, Disp: 30 tablet, Rfl: 0    montelukast (SINGULAIR) 10 mg tablet, Take 1 tablet (10 mg total) by mouth daily at bedtime, Disp: 90 tablet, Rfl: 1     Omega-3 Fatty Acids (fish oil) 1,000 mg, Take 3,000 mg by mouth daily, Disp: , Rfl:   No Known Allergies    Objective     Vitals:    05/23/24 1048   BP: 122/80   Pulse: 87   Resp: 15   Temp: 98.4 °F (36.9 °C)   SpO2: 98%     Physical Exam  Constitutional:       General: He is not in acute distress.     Appearance: He is well-developed.   HENT:      Head: Normocephalic and atraumatic.      Right Ear: External ear normal.      Left Ear: External ear normal.      Nose: Nose normal.   Eyes:      General: No scleral icterus.     Conjunctiva/sclera: Conjunctivae normal.   Cardiovascular:      Rate and Rhythm: Normal rate and regular rhythm.   Pulmonary:      Effort: Pulmonary effort is normal. No respiratory distress.      Breath sounds: Normal breath sounds.   Abdominal:      General: Abdomen is flat. There is no distension.      Palpations: Abdomen is soft.      Tenderness: There is no abdominal tenderness.   Skin:     Findings: No rash (on exposed skin.).   Neurological:      Mental Status: He is alert and oriented to person, place, and time.   Psychiatric:         Mood and Affect: Mood normal.         Thought Content: Thought content normal.         Judgment: Judgment normal.     Lymph: No cervical, occipital, supraclavicular, or axillary adenopathy bilaterally.  Extremities: No lower extremity edema bilaterally.    Result Review  Labs:   Latest Reference Range & Units 03/07/24 08:19 04/09/24 08:07 05/20/24 09:30   WBC 4.31 - 10.16 Thousand/uL 3.54 (L) 4.26 (L) 4.66   RBC 3.88 - 5.62 Million/uL 4.78 4.88 4.80   Hemoglobin 12.0 - 17.0 g/dL 14.5 14.9 14.8   Hematocrit 36.5 - 49.3 % 44.2 44.8 44.5   MCV 82 - 98 fL 93 92 93   MCH 26.8 - 34.3 pg 30.3 30.5 30.8   MCHC 31.4 - 37.4 g/dL 32.8 33.3 33.3   RDW 11.6 - 15.1 % 11.9 12.0 11.9   Platelet Count 149 - 390 Thousands/uL 178 211 199   MPV 8.9 - 12.7 fL 12.0 11.6 11.2   nRBC /100 WBCs  0 0   Segmented % 43 - 75 %  42 (L) 41 (L)   Lymphocytes % 14 - 44 %  45 (H) 46 (H)    Monocytes % 4 - 12 %  10 9   Eosinophils % 0 - 6 %  2 3   Basophils % 0 - 1 %  1 1   Immature Grans % 0 - 2 %  0 0   Absolute Immature Grans 0.00 - 0.20 Thousand/uL  0.01 0.01   Absolute Neutrophils 1.85 - 7.62 Thousands/µL  1.80 (L) 1.92   Absolute Lymphocytes 0.60 - 4.47 Thousands/µL  1.90 2.13   Absolute Monocytes 0.17 - 1.22 Thousand/µL  0.42 0.44   Absolute Eosinophils 0.00 - 0.61 Thousand/µL  0.09 0.12   Absolute Basophils 0.00 - 0.10 Thousands/µL  0.04 0.04   (L): Data is abnormally low  (H): Data is abnormally high    Please note:  This report has been generated by a voice recognition software system. Therefore there may be syntax, spelling, and/or grammatical errors. Please call if you have any questions.

## 2024-10-03 ENCOUNTER — TELEPHONE (OUTPATIENT)
Dept: HEMATOLOGY ONCOLOGY | Facility: CLINIC | Age: 24
End: 2024-10-03

## 2024-10-03 NOTE — TELEPHONE ENCOUNTER
I attempted calling patient 2x to reschedule.  I have left him a message on [employer] with the new date and time of his appointment and mailed an appointment card.

## 2024-11-04 ENCOUNTER — TELEPHONE (OUTPATIENT)
Dept: HEMATOLOGY ONCOLOGY | Facility: CLINIC | Age: 24
End: 2024-11-04

## 2024-11-05 ENCOUNTER — TELEPHONE (OUTPATIENT)
Dept: HEMATOLOGY ONCOLOGY | Facility: CLINIC | Age: 24
End: 2024-11-05

## 2024-11-05 NOTE — TELEPHONE ENCOUNTER
Called and left message for patient to inform him that Dr. Landa is no longer with the practice and we need to reschedule this appointment. I informed the patient that Dr. Mamta Ruvalcaba has an appointment for 11/27/2024 @ 8:40am in [place]. Informed the patient I rescheduled it and if he needs to reschedule it he can call the office and we can assist him.

## 2024-11-18 DIAGNOSIS — J45.20 MILD INTERMITTENT ASTHMA WITHOUT COMPLICATION: ICD-10-CM

## 2024-11-19 ENCOUNTER — RESULTS FOLLOW-UP (OUTPATIENT)
Dept: FAMILY MEDICINE CLINIC | Facility: CLINIC | Age: 24
End: 2024-11-19

## 2024-11-19 ENCOUNTER — HOSPITAL ENCOUNTER (OUTPATIENT)
Dept: RADIOLOGY | Age: 24
Discharge: HOME | End: 2024-11-19
Payer: COMMERCIAL

## 2024-11-19 ENCOUNTER — OFFICE VISIT (OUTPATIENT)
Dept: FAMILY MEDICINE CLINIC | Facility: CLINIC | Age: 24
End: 2024-11-19
Payer: COMMERCIAL

## 2024-11-19 ENCOUNTER — APPOINTMENT (OUTPATIENT)
Dept: LAB | Age: 24
End: 2024-11-19
Payer: COMMERCIAL

## 2024-11-19 VITALS
HEART RATE: 68 BPM | WEIGHT: 164.2 LBS | BODY MASS INDEX: 24.32 KG/M2 | HEIGHT: 69 IN | OXYGEN SATURATION: 99 % | DIASTOLIC BLOOD PRESSURE: 70 MMHG | SYSTOLIC BLOOD PRESSURE: 118 MMHG

## 2024-11-19 DIAGNOSIS — K21.9 GASTROESOPHAGEAL REFLUX DISEASE, UNSPECIFIED WHETHER ESOPHAGITIS PRESENT: ICD-10-CM

## 2024-11-19 DIAGNOSIS — J45.20 MILD INTERMITTENT ASTHMA WITHOUT COMPLICATION: ICD-10-CM

## 2024-11-19 DIAGNOSIS — D72.820 LYMPHOCYTOSIS: ICD-10-CM

## 2024-11-19 DIAGNOSIS — R10.9 ABDOMINAL PAIN, UNSPECIFIED ABDOMINAL LOCATION: ICD-10-CM

## 2024-11-19 DIAGNOSIS — R10.9 ABDOMINAL PAIN, UNSPECIFIED ABDOMINAL LOCATION: Primary | ICD-10-CM

## 2024-11-19 LAB
ALBUMIN SERPL BCG-MCNC: 4.7 G/DL (ref 3.5–5)
ALP SERPL-CCNC: 66 U/L (ref 34–104)
ALT SERPL W P-5'-P-CCNC: 16 U/L (ref 7–52)
AMYLASE SERPL-CCNC: 59 IU/L (ref 29–103)
ANION GAP SERPL CALCULATED.3IONS-SCNC: 9 MMOL/L (ref 4–13)
AST SERPL W P-5'-P-CCNC: 21 U/L (ref 13–39)
BASOPHILS # BLD AUTO: 0.03 THOUSANDS/ÂΜL (ref 0–0.1)
BASOPHILS NFR BLD AUTO: 1 % (ref 0–1)
BILIRUB SERPL-MCNC: 0.33 MG/DL (ref 0.2–1)
BILIRUB UR QL STRIP: NEGATIVE
BUN SERPL-MCNC: 15 MG/DL (ref 5–25)
CALCIUM SERPL-MCNC: 9.1 MG/DL (ref 8.4–10.2)
CHLORIDE SERPL-SCNC: 102 MMOL/L (ref 96–108)
CLARITY UR: CLEAR
CO2 SERPL-SCNC: 27 MMOL/L (ref 21–32)
COLOR UR: COLORLESS
CREAT SERPL-MCNC: 0.94 MG/DL (ref 0.6–1.3)
EOSINOPHIL # BLD AUTO: 0.2 THOUSAND/ÂΜL (ref 0–0.61)
EOSINOPHIL NFR BLD AUTO: 4 % (ref 0–6)
ERYTHROCYTE [DISTWIDTH] IN BLOOD BY AUTOMATED COUNT: 12.1 % (ref 11.6–15.1)
GFR SERPL CREATININE-BSD FRML MDRD: 112 ML/MIN/1.73SQ M
GLUCOSE SERPL-MCNC: 83 MG/DL (ref 65–140)
GLUCOSE UR STRIP-MCNC: NEGATIVE MG/DL
HCT VFR BLD AUTO: 44.1 % (ref 36.5–49.3)
HGB BLD-MCNC: 14.9 G/DL (ref 12–17)
HGB UR QL STRIP.AUTO: NEGATIVE
IMM GRANULOCYTES # BLD AUTO: 0.01 THOUSAND/UL (ref 0–0.2)
IMM GRANULOCYTES NFR BLD AUTO: 0 % (ref 0–2)
KETONES UR STRIP-MCNC: NEGATIVE MG/DL
LEUKOCYTE ESTERASE UR QL STRIP: NEGATIVE
LIPASE SERPL-CCNC: 26 U/L (ref 11–82)
LYMPHOCYTES # BLD AUTO: 1.8 THOUSANDS/ÂΜL (ref 0.6–4.47)
LYMPHOCYTES NFR BLD AUTO: 37 % (ref 14–44)
MCH RBC QN AUTO: 31 PG (ref 26.8–34.3)
MCHC RBC AUTO-ENTMCNC: 33.8 G/DL (ref 31.4–37.4)
MCV RBC AUTO: 92 FL (ref 82–98)
MONOCYTES # BLD AUTO: 0.6 THOUSAND/ÂΜL (ref 0.17–1.22)
MONOCYTES NFR BLD AUTO: 12 % (ref 4–12)
NEUTROPHILS # BLD AUTO: 2.28 THOUSANDS/ÂΜL (ref 1.85–7.62)
NEUTS SEG NFR BLD AUTO: 46 % (ref 43–75)
NITRITE UR QL STRIP: NEGATIVE
NRBC BLD AUTO-RTO: 0 /100 WBCS
PH UR STRIP.AUTO: 6 [PH]
PLATELET # BLD AUTO: 201 THOUSANDS/UL (ref 149–390)
PMV BLD AUTO: 10.6 FL (ref 8.9–12.7)
POTASSIUM SERPL-SCNC: 4.1 MMOL/L (ref 3.5–5.3)
PROT SERPL-MCNC: 7.3 G/DL (ref 6.4–8.4)
PROT UR STRIP-MCNC: NEGATIVE MG/DL
RBC # BLD AUTO: 4.81 MILLION/UL (ref 3.88–5.62)
SODIUM SERPL-SCNC: 138 MMOL/L (ref 135–147)
SP GR UR STRIP.AUTO: 1.01 (ref 1–1.03)
UROBILINOGEN UR STRIP-ACNC: <2 MG/DL
WBC # BLD AUTO: 4.92 THOUSAND/UL (ref 4.31–10.16)

## 2024-11-19 PROCEDURE — 81003 URINALYSIS AUTO W/O SCOPE: CPT

## 2024-11-19 PROCEDURE — 80053 COMPREHEN METABOLIC PANEL: CPT

## 2024-11-19 PROCEDURE — 74177 CT ABD & PELVIS W/CONTRAST: CPT

## 2024-11-19 PROCEDURE — 99214 OFFICE O/P EST MOD 30 MIN: CPT | Performed by: FAMILY MEDICINE

## 2024-11-19 PROCEDURE — 82150 ASSAY OF AMYLASE: CPT

## 2024-11-19 PROCEDURE — 83690 ASSAY OF LIPASE: CPT

## 2024-11-19 PROCEDURE — 85025 COMPLETE CBC W/AUTO DIFF WBC: CPT

## 2024-11-19 PROCEDURE — 36415 COLL VENOUS BLD VENIPUNCTURE: CPT

## 2024-11-19 RX ORDER — DICYCLOMINE HYDROCHLORIDE 10 MG/1
CAPSULE ORAL
Qty: 30 CAPSULE | Refills: 0 | Status: SHIPPED | OUTPATIENT
Start: 2024-11-19

## 2024-11-19 RX ORDER — NICOTINE POLACRILEX 4 MG/1
20 GUM, CHEWING ORAL DAILY
COMMUNITY
Start: 2024-11-18 | End: 2024-11-20

## 2024-11-19 RX ORDER — MONTELUKAST SODIUM 10 MG/1
10 TABLET ORAL
Qty: 90 TABLET | Refills: 0 | Status: SHIPPED | OUTPATIENT
Start: 2024-11-19

## 2024-11-19 RX ADMIN — IOHEXOL 100 ML: 350 INJECTION, SOLUTION INTRAVENOUS at 13:41

## 2024-11-19 RX ADMIN — IOHEXOL 50 ML: 240 INJECTION, SOLUTION INTRATHECAL; INTRAVASCULAR; INTRAVENOUS; ORAL at 13:41

## 2024-11-19 NOTE — PATIENT INSTRUCTIONS
Continue omeprazole 20 mg daily  May use Bentyl/dicyclomine 10 mg every 6 hours as needed for abdominal pain or cramping  Complete CT scan of abdomen today  Complete blood work today  Recheck 24 hours  If symptoms worsen report to ER

## 2024-11-19 NOTE — ASSESSMENT & PLAN NOTE
Continue omeprazole 20 mg daily    Orders:    CBC and differential; Future    Comprehensive metabolic panel; Future    UA (URINE) with reflex to Scope; Future    Amylase; Future    Lipase; Future

## 2024-11-19 NOTE — ASSESSMENT & PLAN NOTE
For consultation with hematology reactive lymphocytosis    Orders:    CBC and differential; Future    Comprehensive metabolic panel; Future    UA (URINE) with reflex to Scope; Future    Amylase; Future    Lipase; Future

## 2024-11-19 NOTE — ASSESSMENT & PLAN NOTE
Stable, lungs are clear    Orders:    CBC and differential; Future    Comprehensive metabolic panel; Future    UA (URINE) with reflex to Scope; Future    Amylase; Future    Lipase; Future

## 2024-11-19 NOTE — PROGRESS NOTES
Name: Bonifacio Liriano      : 2000      MRN: [id number]  Encounter Provider: Calos Sellers DO  Encounter Date: 2024   Encounter department: On license of [facility] PRIMARY CARE  :  Assessment & Plan  Abdominal pain, unspecified abdominal location  Blood work ordered  CT ordered  Bentyl ordered  Orders:    CBC and differential; Future    Comprehensive metabolic panel; Future    UA (URINE) with reflex to Scope; Future    Amylase; Future    Lipase; Future    CT abdomen pelvis w contrast; Future    dicyclomine (BENTYL) 10 mg capsule; Take 1 capsule every 6 hours as needed for abdominal pain or cramping    Lymphocytosis  For consultation with hematology reactive lymphocytosis    Orders:    CBC and differential; Future    Comprehensive metabolic panel; Future    UA (URINE) with reflex to Scope; Future    Amylase; Future    Lipase; Future    Mild intermittent asthma without complication  Stable, lungs are clear    Orders:    CBC and differential; Future    Comprehensive metabolic panel; Future    UA (URINE) with reflex to Scope; Future    Amylase; Future    Lipase; Future    Gastroesophageal reflux disease, unspecified whether esophagitis present  Continue omeprazole 20 mg daily    Orders:    CBC and differential; Future    Comprehensive metabolic panel; Future    UA (URINE) with reflex to Scope; Future    Amylase; Future    Lipase; Future           History of Present Illness     Patient started with  night with some epigastric pain and burning up to the throat was seen at urgent care yesterday started on omeprazole that pain is improved however patient still has abdominal pain patient describes it is periumbilical and occasional right upper quadrant denies nausea vomiting but does state that food makes this work.  Denies change in stool, melena hematochezia no fever chills.    Abdominal Pain  This is a new problem. The current episode started in the past 7 days. The onset quality is sudden. The problem  "occurs constantly. The most recent episode lasted 3 hours. The problem has been waxing and waning. The pain is located in the generalized abdominal region. The pain is at a severity of 5/10. The quality of the pain is aching, cramping and dull. The abdominal pain does not radiate. Associated symptoms include anorexia, belching and nausea. Pertinent negatives include no arthralgias, constipation, diarrhea, dysuria, fever, flatus, frequency, headaches, hematochezia, hematuria, melena, myalgias, vomiting or weight loss. The pain is aggravated by being still, certain positions, eating and movement. The pain is relieved by Nothing.     Review of Systems   Constitutional:  Negative for fever and weight loss.   HENT: Negative.     Eyes: Negative.    Respiratory: Negative.     Cardiovascular: Negative.    Gastrointestinal:  Positive for abdominal pain, anorexia and nausea. Negative for constipation, diarrhea, flatus, hematochezia, melena and vomiting.   Endocrine: Negative.    Genitourinary:  Negative for dysuria, frequency and hematuria.   Musculoskeletal:  Negative for arthralgias and myalgias.   Allergic/Immunologic: Negative.    Neurological:  Negative for headaches.   Hematological: Negative.    Psychiatric/Behavioral: Negative.            Objective   /70 (BP Location: Right arm, Patient Position: Sitting, Cuff Size: Standard)   Pulse 68   Ht 5' 9\" (1.753 m)   Wt 74.5 kg (164 lb 3.2 oz)   SpO2 99%   BMI 24.25 kg/m²      Physical Exam  Vitals and nursing note reviewed.   Constitutional:       General: He is not in acute distress.     Appearance: He is well-developed. He is not ill-appearing, toxic-appearing or diaphoretic.   HENT:      Head: Normocephalic and atraumatic.      Mouth/Throat:      Mouth: Mucous membranes are moist.   Cardiovascular:      Rate and Rhythm: Normal rate and regular rhythm.      Heart sounds: Normal heart sounds.   Pulmonary:      Effort: Pulmonary effort is normal.      Breath " sounds: Normal breath sounds.   Abdominal:      General: Bowel sounds are normal.      Palpations: Abdomen is soft. There is no shifting dullness, fluid wave, hepatomegaly, splenomegaly, mass or pulsatile mass.      Tenderness: There is abdominal tenderness in the right upper quadrant and periumbilical area.      Hernia: There is no hernia in the umbilical area or ventral area.   Skin:     General: Skin is warm and dry.   Neurological:      General: No focal deficit present.      Mental Status: He is alert.   Psychiatric:         Mood and Affect: Mood normal.

## 2024-11-20 ENCOUNTER — TELEPHONE (OUTPATIENT)
Dept: FAMILY MEDICINE CLINIC | Facility: CLINIC | Age: 24
End: 2024-11-20

## 2024-11-20 ENCOUNTER — OFFICE VISIT (OUTPATIENT)
Dept: FAMILY MEDICINE CLINIC | Facility: CLINIC | Age: 24
End: 2024-11-20
Payer: COMMERCIAL

## 2024-11-20 VITALS
WEIGHT: 165 LBS | SYSTOLIC BLOOD PRESSURE: 110 MMHG | BODY MASS INDEX: 24.44 KG/M2 | OXYGEN SATURATION: 100 % | HEART RATE: 78 BPM | HEIGHT: 69 IN | DIASTOLIC BLOOD PRESSURE: 68 MMHG

## 2024-11-20 DIAGNOSIS — T50.905A ADVERSE EFFECT OF DRUG, INITIAL ENCOUNTER: ICD-10-CM

## 2024-11-20 DIAGNOSIS — R20.2 PARESTHESIA: ICD-10-CM

## 2024-11-20 DIAGNOSIS — D72.820 LYMPHOCYTOSIS: ICD-10-CM

## 2024-11-20 DIAGNOSIS — K21.9 GASTROESOPHAGEAL REFLUX DISEASE, UNSPECIFIED WHETHER ESOPHAGITIS PRESENT: ICD-10-CM

## 2024-11-20 DIAGNOSIS — K59.00 CONSTIPATION, UNSPECIFIED CONSTIPATION TYPE: ICD-10-CM

## 2024-11-20 DIAGNOSIS — R10.9 ABDOMINAL PAIN, UNSPECIFIED ABDOMINAL LOCATION: Primary | ICD-10-CM

## 2024-11-20 PROCEDURE — 99214 OFFICE O/P EST MOD 30 MIN: CPT | Performed by: FAMILY MEDICINE

## 2024-11-20 NOTE — PROGRESS NOTES
"Name: Bonifacio Liriano      : 2000      MRN: [id number]  Encounter Provider: Calos Sellers DO  Encounter Date: 2024   Encounter department: On license of [facility]  :  Assessment & Plan  Abdominal pain, unspecified abdominal location  Patient doing much better  CT shows constipation no acute change  Blood work was normal  Treat constipation as below       Lymphocytosis  CBC WBC lymphocytes normal       Constipation, unspecified constipation type  Increase p.o. fluids to 2 L daily  Lactulose 20 g packet used twice a day as needed for constipation  Orders:    lactulose (CEPHULAC) 20 g packet; Placed 1 pack in 8 ounces of water or juice twice daily as needed for constipation    Gastroesophageal reflux disease, unspecified whether esophagitis present  Patient is having side effects omeprazole will discontinue and monitor         Adverse effect of drug, initial encounter  Discontinue omeprazole patient felt he had itchy skin, no rash       Paresthesia  Discontinue omeprazole       1.  Per abdominal pain improving CT and blood work benign  2.  lymphocytosis CBC was perfectly normal  3.  constipation, I believe as cause for abdominal pain.  Patient increase fluids to 2 L daily.  Lactulose package 20 g in 8 ounces of fluid and water twice daily as needed  4.  GERD/adverse drug reaction/paresthesias  Patient feels he had \"itchy skin\" on omeprazole.  No rash  Discontinue omeprazole and monitor  5.  Recheck 2 weeks sooner if needed         History of Present Illness     Patient feels abdominal pain is subsiding significantly.  Patient states he only drinks about 120 cc of fluid daily.  Explained this could be the cause of his constipation.  Patient states he feels numbness and itching of his neck after he takes his omeprazole we will discontinue this.  CT and blood work discussed      Review of Systems   Constitutional:  Negative for chills and fever.   HENT: Negative.     Eyes: Negative.  " "  Respiratory: Negative.     Cardiovascular: Negative.    Gastrointestinal:         HPI   Endocrine: Negative.    Genitourinary: Negative.    Musculoskeletal: Negative.    Skin:         HPI   Allergic/Immunologic: Negative.    Neurological: Negative.    Hematological: Negative.    Psychiatric/Behavioral: Negative.            Objective   /68 (BP Location: Right arm, Patient Position: Sitting, Cuff Size: Standard)   Pulse 78   Ht 5' 9\" (1.753 m)   Wt 74.8 kg (165 lb)   SpO2 100%   BMI 24.37 kg/m²      Physical Exam  Vitals and nursing note reviewed.   Constitutional:       General: He is not in acute distress.     Appearance: Normal appearance. He is not ill-appearing, toxic-appearing or diaphoretic.   HENT:      Head: Normocephalic and atraumatic.      Mouth/Throat:      Mouth: Mucous membranes are moist.   Eyes:      General: No scleral icterus.  Cardiovascular:      Rate and Rhythm: Normal rate and regular rhythm.      Heart sounds: Normal heart sounds.   Pulmonary:      Effort: Pulmonary effort is normal.      Breath sounds: Normal breath sounds.   Abdominal:      General: Bowel sounds are normal.      Palpations: Abdomen is soft.      Tenderness: There is no abdominal tenderness. There is no right CVA tenderness or left CVA tenderness.   Musculoskeletal:      Right lower leg: No edema.      Left lower leg: No edema.   Skin:     General: Skin is warm and dry.      Findings: No rash.   Neurological:      General: No focal deficit present.      Mental Status: He is alert.   Psychiatric:         Mood and Affect: Mood normal.         "

## 2024-11-20 NOTE — TELEPHONE ENCOUNTER
Called pt today in regards of trying to schedule pt appointment for today 11/20 with provider Marlo Live instead of Jose D Gayle.     As this follow up was to be scheduled with provider as needed, if pt calls back please advice and try schedule with provider. If pt refuses and wishes to see provider Irwin instead, leave appointment as needed for pt.     Thank you,   Nancy

## 2024-11-20 NOTE — PATIENT INSTRUCTIONS
Increase p.o. fluids to 2 L daily  Use lactulose 20 g packet in 8 ounces of water or juice twice daily as needed for constipation  Discontinue omeprazole  Recheck 2 weeks

## 2024-11-27 ENCOUNTER — APPOINTMENT (OUTPATIENT)
Dept: LAB | Facility: CLINIC | Age: 24
End: 2024-11-27
Payer: COMMERCIAL

## 2024-11-27 ENCOUNTER — OFFICE VISIT (OUTPATIENT)
Dept: HEMATOLOGY ONCOLOGY | Facility: CLINIC | Age: 24
End: 2024-11-27
Payer: COMMERCIAL

## 2024-11-27 VITALS
TEMPERATURE: 97.7 F | SYSTOLIC BLOOD PRESSURE: 114 MMHG | HEIGHT: 69 IN | WEIGHT: 165.5 LBS | OXYGEN SATURATION: 99 % | HEART RATE: 88 BPM | RESPIRATION RATE: 18 BRPM | BODY MASS INDEX: 24.51 KG/M2 | DIASTOLIC BLOOD PRESSURE: 80 MMHG

## 2024-11-27 DIAGNOSIS — D72.820 LYMPHOCYTOSIS: Primary | ICD-10-CM

## 2024-11-27 DIAGNOSIS — D72.820 LYMPHOCYTOSIS: ICD-10-CM

## 2024-11-27 PROCEDURE — 99214 OFFICE O/P EST MOD 30 MIN: CPT | Performed by: INTERNAL MEDICINE

## 2024-11-27 PROCEDURE — 82175 ASSAY OF ARSENIC: CPT

## 2024-11-27 PROCEDURE — 83655 ASSAY OF LEAD: CPT

## 2024-11-27 PROCEDURE — 88184 FLOWCYTOMETRY/ TC 1 MARKER: CPT

## 2024-11-27 PROCEDURE — 88185 FLOWCYTOMETRY/TC ADD-ON: CPT | Performed by: INTERNAL MEDICINE

## 2024-11-27 PROCEDURE — 83825 ASSAY OF MERCURY: CPT

## 2024-11-27 PROCEDURE — 36415 COLL VENOUS BLD VENIPUNCTURE: CPT

## 2024-11-27 NOTE — PROGRESS NOTES
Hematology Outpatient Follow - Up Note  Bonifacio Liriano 24 y.o. male MRN: @ Encounter: [id number]        Date:  11/27/2024        Assessment/ Plan:    1. Leukopenia, unspecified type  Patient presents for evaluation of leukopenia.     His past medical history is significant for mild asthma, anxiety, high cholesterol.     He had lab work completed on 3/7/2024.  WBC count was 3.54, no differential, hemoglobin 14.5 and platelets 178.    1/11/2024 WBC 4.9, hemoglobin 14.9, platelets 201,000, 46% neutrophils, 37% lymphocytes     He had lab work repeated on 4/9/2024.  At that time WBC count was 4.26, lymphocytes were 45% (normal 14 to 44%), absolute lymphocyte count was normal, ANC was 1800    Reactive lymphocytosis, will do flow cytometry    He has exposure to heavy metals, will do heavy metals profile.  And will follow-up on as-needed basis        Labs and imaging studies are reviewed by ordering provider once results are available. If there are findings that need immediate attention, you will be contacted when results available.   Discussing results and the implication on your healthcare is best discussed in person at your follow-up visit.       HPI:  Patient presents for evaluation of leukopenia.     His past medical history is significant for mild asthma, anxiety, high cholesterol.     He had routine lab work completed on 3/7/2024.  At that time WBC count was 3.54, no differential, hemoglobin 14.5 and platelets 178.     He had lab work repeated on 4/9/2024.  At that time WBC count was 4.26, lymphocytes were 45% (normal 14 to 44%), absolute lymphocyte count was normal, ANC was 1800.     Lab work was repeated again on 5/20/2024.  WBC count was normal at 4.66, lymphocytes 46%, absolute lymphocyte count was normal, hemoglobin normal at 14.8, platelets normal at 199.     Patient is feeling well.  He denies any B symptoms.  No unexplained weight loss, drenching night sweats, unexplained fevers or palpable  adenopathy.    Interval History:        Previous Treatment:         Test Results:    Imaging: CT abdomen pelvis w contrast  Result Date: 11/19/2024  Narrative: CT ABDOMEN AND PELVIS WITH IV CONTRAST INDICATION: R10.9: Unspecified abdominal pain. . COMPARISON: None. TECHNIQUE: CT examination of the abdomen and pelvis was performed. Multiplanar 2D reformatted images were created from the source data. This examination, like all CT scans performed in the [facility], was performed utilizing techniques to minimize radiation dose exposure, including the use of iterative reconstruction and automated exposure control. Radiation dose length product (DLP) for this visit: 393 mGy-cm IV Contrast: 50 mL of iohexol (OMNIPAQUE) 100 mL of iohexol (OMNIPAQUE) Enteric Contrast: Administered. FINDINGS: ABDOMEN LOWER CHEST: No clinically significant abnormality in the visualized lower chest. LIVER/BILIARY TREE: Unremarkable. GALLBLADDER: No calcified gallstones. No pericholecystic inflammatory change. SPLEEN: Unremarkable. PANCREAS: Unremarkable. ADRENAL GLANDS: Unremarkable. KIDNEYS/URETERS: Subcentimeter hypoattenuating renal lesion(s), too small to characterize but statistically likely benign, which do not warrant follow-up (Radiology June 2019). No hydronephrosis. STOMACH AND BOWEL: No evidence for bowel obstruction. Large fecal residual throughout the colon consistent with constipation. APPENDIX: No findings to suggest appendicitis. ABDOMINOPELVIC CAVITY: No ascites. No pneumoperitoneum. No lymphadenopathy. VESSELS: Unremarkable for patient's age. PELVIS REPRODUCTIVE ORGANS: Unremarkable for patient's age. URINARY BLADDER: Unremarkable. ABDOMINAL WALL/INGUINAL REGIONS: Unremarkable. BONES: No acute fracture or suspicious osseous lesion.     Impression: No acute intra-abdominal pathology. Constipation noted. Workstation performed: [id number]       Labs:   Lab Results   Component Value Date    WBC 4.92 11/19/2024     "HGB 14.9 11/19/2024    HCT 44.1 11/19/2024    MCV 92 11/19/2024     11/19/2024     Lab Results   Component Value Date    K 4.1 11/19/2024     11/19/2024    CO2 27 11/19/2024    BUN 15 11/19/2024    CREATININE 0.94 11/19/2024    GLUF 81 03/07/2024    CALCIUM 9.1 11/19/2024    AST 21 11/19/2024    ALT 16 11/19/2024    ALKPHOS 66 11/19/2024    EGFR 112 11/19/2024       No results found for: \"IRON\", \"TIBC\", \"FERRITIN\"    No results found for: \"CVXHQOKF82\"      ROS: Review of Systems   Constitutional: Negative.  Negative for appetite change, chills, diaphoresis, fatigue, fever and unexpected weight change.   HENT:   Negative for hearing loss, lump/mass, mouth sores, nosebleeds, sore throat, trouble swallowing and voice change.    Eyes: Negative.  Negative for eye problems and icterus.   Respiratory: Negative.  Negative for chest tightness, cough, hemoptysis and shortness of breath.    Cardiovascular:  Negative for chest pain and leg swelling.   Gastrointestinal:  Positive for constipation. Negative for abdominal distention, abdominal pain, blood in stool, diarrhea and nausea.   Endocrine: Negative.    Genitourinary:  Negative for dysuria, frequency, hematuria and pelvic pain.    Musculoskeletal: Negative.  Negative for arthralgias, back pain, flank pain, gait problem, myalgias and neck stiffness.   Skin:  Negative for itching and rash.   Neurological:  Negative for dizziness, gait problem, headaches, light-headedness, numbness and speech difficulty.   Hematological:  Negative for adenopathy. Does not bruise/bleed easily.   Psychiatric/Behavioral:  Negative for confusion, decreased concentration, depression and sleep disturbance. The patient is not nervous/anxious.           Current Medications: Reviewed  Allergies: Reviewed  PMH/FH/SH:  Reviewed      Physical Exam:    Body surface area is 1.91 meters squared.    Wt Readings from Last 3 Encounters:   11/27/24 75.1 kg (165 lb 8 oz)   11/20/24 74.8 kg (165 lb) "   11/19/24 74.5 kg (164 lb 3.2 oz)        Temp Readings from Last 3 Encounters:   11/27/24 97.7 °F (36.5 °C) (Temporal)   05/23/24 98.4 °F (36.9 °C) (Temporal)   07/20/23 (!) 96.5 °F (35.8 °C) (Temporal)        BP Readings from Last 3 Encounters:   11/27/24 114/80   11/20/24 110/68   11/19/24 118/70         Pulse Readings from Last 3 Encounters:   11/27/24 88   11/20/24 78   11/19/24 68        Physical Exam  Vitals reviewed.   Constitutional:       General: He is not in acute distress.     Appearance: He is well-developed. He is not diaphoretic.   HENT:      Head: Normocephalic and atraumatic.   Eyes:      Conjunctiva/sclera: Conjunctivae normal.   Neck:      Trachea: No tracheal deviation.   Cardiovascular:      Rate and Rhythm: Normal rate and regular rhythm.      Heart sounds: No murmur heard.     No friction rub. No gallop.   Pulmonary:      Effort: Pulmonary effort is normal. No respiratory distress.      Breath sounds: Normal breath sounds. No wheezing or rales.   Chest:      Chest wall: No tenderness.   Abdominal:      General: There is no distension.      Palpations: Abdomen is soft.      Tenderness: There is no abdominal tenderness.   Musculoskeletal:      Cervical back: Normal range of motion and neck supple.      Right lower leg: No edema.      Left lower leg: No edema.   Lymphadenopathy:      Cervical: No cervical adenopathy.   Skin:     General: Skin is warm and dry.      Coloration: Skin is not pale.      Findings: No erythema.   Neurological:      Mental Status: He is alert. Mental status is at baseline.   Psychiatric:         Behavior: Behavior normal.         Thought Content: Thought content normal.         ECOG PS:0    Goals and Barriers:  Current Goal: Minimize effects of disease.   Barriers: None.      Patient's Capacity to Self Care:  Patient is able to self care.    Code Status: @[facility]@

## 2024-11-29 ENCOUNTER — RA CDI HCC (OUTPATIENT)
Dept: OTHER | Facility: HOSPITAL | Age: 24
End: 2024-11-29

## 2024-12-02 LAB — SCAN RESULT: NORMAL

## 2024-12-03 ENCOUNTER — OFFICE VISIT (OUTPATIENT)
Dept: FAMILY MEDICINE CLINIC | Facility: CLINIC | Age: 24
End: 2024-12-03
Payer: COMMERCIAL

## 2024-12-03 VITALS
HEIGHT: 69 IN | SYSTOLIC BLOOD PRESSURE: 110 MMHG | DIASTOLIC BLOOD PRESSURE: 70 MMHG | OXYGEN SATURATION: 97 % | WEIGHT: 166.4 LBS | BODY MASS INDEX: 24.65 KG/M2 | HEART RATE: 71 BPM

## 2024-12-03 DIAGNOSIS — D72.820 LYMPHOCYTOSIS: Primary | ICD-10-CM

## 2024-12-03 DIAGNOSIS — K59.00 CONSTIPATION, UNSPECIFIED CONSTIPATION TYPE: ICD-10-CM

## 2024-12-03 DIAGNOSIS — R10.9 ABDOMINAL PAIN, UNSPECIFIED ABDOMINAL LOCATION: ICD-10-CM

## 2024-12-03 DIAGNOSIS — J45.20 MILD INTERMITTENT ASTHMA WITHOUT COMPLICATION: ICD-10-CM

## 2024-12-03 DIAGNOSIS — K21.9 GASTROESOPHAGEAL REFLUX DISEASE, UNSPECIFIED WHETHER ESOPHAGITIS PRESENT: ICD-10-CM

## 2024-12-03 LAB
ARSENIC BLD-MCNC: 5 UG/L (ref 0–9)
LEAD BLD-MCNC: <1 UG/DL (ref 0–3.4)
MERCURY BLD-MCNC: <1 UG/L (ref 0–14.9)

## 2024-12-03 PROCEDURE — 99214 OFFICE O/P EST MOD 30 MIN: CPT | Performed by: FAMILY MEDICINE

## 2024-12-03 NOTE — PROGRESS NOTES
Name: Bonifacio Liriano      : 2000      MRN: [id number]  Encounter Provider: Calos Sellers DO  Encounter Date: 12/3/2024   Encounter department: [facility]  1.  Lymphocytosis, chest benign reactive lymphocytosis per hematology last CBC normal 2.  GERD, symptoms resolved off of medication  3.  Abdominal pain doing well patient stopped Bentyl almost fully resolved  4.   constipation, had 1 episode over weekend relieved with lactulose  5.  Asthma, stable continue present therapy #6 PRID recheck 6 weeks sooner if needed  :  Assessment & Plan  Lymphocytosis  Last CBC is normal patient was evaluated by hematology         Gastroesophageal reflux disease, unspecified whether esophagitis present  Seems resolved off medication we will monitor         Abdominal pain, unspecified abdominal location  Seems almost fully resolved.  Patient no longer using Bentyl       Constipation, unspecified constipation type  Much improved with lactulose 20 mg packets as needed       Mild intermittent asthma without complication  Stable, lungs are clear                History of Present Illness     Patient doing well.  Patient had 1 minor episode of abdominal cramping over this past weekend use lactulose and symptoms completely resolved.  Patient did see hematology felt to be reactive lymphocytosis last CBC was normal.  Patient is not using Bentyl for abdominal pain no recurrent reflux symptoms off medication      Review of Systems   Constitutional: Negative.    HENT: Negative.     Eyes: Negative.    Respiratory: Negative.     Cardiovascular: Negative.    Gastrointestinal:         HPI   Endocrine: Negative.    Genitourinary: Negative.    Musculoskeletal: Negative.    Skin: Negative.    Allergic/Immunologic: Negative.    Neurological: Negative.    Hematological:         HPI   Psychiatric/Behavioral: Negative.            Objective   /70 (BP Location: Right arm, Patient Position: Sitting, Cuff Size:  "Standard)   Pulse 71   Ht 5' 9\" (1.753 m)   Wt 75.5 kg (166 lb 6.4 oz)   SpO2 97%   BMI 24.57 kg/m²      Physical Exam  Vitals and nursing note reviewed.   Constitutional:       Appearance: Normal appearance.   HENT:      Head: Normocephalic and atraumatic.      Mouth/Throat:      Mouth: Mucous membranes are moist.   Eyes:      General: No scleral icterus.  Cardiovascular:      Rate and Rhythm: Normal rate and regular rhythm.      Heart sounds: Normal heart sounds.   Pulmonary:      Effort: Pulmonary effort is normal.      Breath sounds: Normal breath sounds.   Abdominal:      General: Bowel sounds are normal. There is no distension.      Palpations: Abdomen is soft. There is no mass.      Tenderness: There is no abdominal tenderness. There is no right CVA tenderness, left CVA tenderness, guarding or rebound.      Hernia: No hernia is present.   Musculoskeletal:      Cervical back: Neck supple. No rigidity.      Right lower leg: No edema.      Left lower leg: No edema.   Lymphadenopathy:      Cervical: No cervical adenopathy.   Skin:     General: Skin is warm and dry.   Neurological:      General: No focal deficit present.      Mental Status: He is alert.   Psychiatric:         Mood and Affect: Mood normal.         "

## 2025-04-01 ENCOUNTER — TELEPHONE (OUTPATIENT)
Dept: FAMILY MEDICINE CLINIC | Facility: CLINIC | Age: 25
End: 2025-04-01

## 2025-04-01 NOTE — TELEPHONE ENCOUNTER
Patient did not show up for his scheduled annual exam today, 4/1/2025.  Please call patient have him complete blood work that was ordered and reschedule his follow-up in the next few weeks

## 2025-04-14 ENCOUNTER — APPOINTMENT (OUTPATIENT)
Dept: LAB | Facility: CLINIC | Age: 25
End: 2025-04-14
Payer: COMMERCIAL

## 2025-04-14 DIAGNOSIS — H69.90 DYSFUNCTION OF EUSTACHIAN TUBE, UNSPECIFIED LATERALITY: ICD-10-CM

## 2025-04-14 DIAGNOSIS — E78.00 PURE HYPERCHOLESTEROLEMIA: ICD-10-CM

## 2025-04-14 DIAGNOSIS — J45.20 MILD INTERMITTENT ASTHMA WITHOUT COMPLICATION: ICD-10-CM

## 2025-04-14 DIAGNOSIS — R00.2 PALPITATIONS: ICD-10-CM

## 2025-04-14 DIAGNOSIS — J30.9 ALLERGIC RHINITIS, UNSPECIFIED SEASONALITY, UNSPECIFIED TRIGGER: ICD-10-CM

## 2025-04-14 DIAGNOSIS — D72.819 LEUKOPENIA, UNSPECIFIED TYPE: ICD-10-CM

## 2025-04-14 DIAGNOSIS — D49.2 SKIN NEOPLASM: ICD-10-CM

## 2025-04-14 DIAGNOSIS — F40.10 SOCIAL ANXIETY DISORDER: ICD-10-CM

## 2025-04-14 LAB
CHOLEST SERPL-MCNC: 228 MG/DL (ref ?–200)
ERYTHROCYTE [DISTWIDTH] IN BLOOD BY AUTOMATED COUNT: 12.1 % (ref 11.6–15.1)
HCT VFR BLD AUTO: 45.9 % (ref 36.5–49.3)
HDLC SERPL-MCNC: 71 MG/DL
HGB BLD-MCNC: 15.2 G/DL (ref 12–17)
LDLC SERPL CALC-MCNC: 144 MG/DL (ref 0–100)
MCH RBC QN AUTO: 30.6 PG (ref 26.8–34.3)
MCHC RBC AUTO-ENTMCNC: 33.1 G/DL (ref 31.4–37.4)
MCV RBC AUTO: 93 FL (ref 82–98)
PLATELET # BLD AUTO: 206 THOUSANDS/UL (ref 149–390)
PMV BLD AUTO: 10.5 FL (ref 8.9–12.7)
RBC # BLD AUTO: 4.96 MILLION/UL (ref 3.88–5.62)
TRIGL SERPL-MCNC: 63 MG/DL (ref ?–150)
TSH SERPL DL<=0.05 MIU/L-ACNC: 1.82 UIU/ML (ref 0.45–4.5)
WBC # BLD AUTO: 3.85 THOUSAND/UL (ref 4.31–10.16)

## 2025-04-14 PROCEDURE — 84443 ASSAY THYROID STIM HORMONE: CPT

## 2025-04-14 PROCEDURE — 36415 COLL VENOUS BLD VENIPUNCTURE: CPT

## 2025-04-14 PROCEDURE — 85027 COMPLETE CBC AUTOMATED: CPT

## 2025-04-14 PROCEDURE — 80061 LIPID PANEL: CPT

## 2025-04-16 ENCOUNTER — OFFICE VISIT (OUTPATIENT)
Dept: FAMILY MEDICINE CLINIC | Facility: CLINIC | Age: 25
End: 2025-04-16
Payer: COMMERCIAL

## 2025-04-16 VITALS
OXYGEN SATURATION: 97 % | HEIGHT: 69 IN | HEART RATE: 72 BPM | WEIGHT: 166 LBS | DIASTOLIC BLOOD PRESSURE: 70 MMHG | SYSTOLIC BLOOD PRESSURE: 112 MMHG | BODY MASS INDEX: 24.59 KG/M2

## 2025-04-16 DIAGNOSIS — D72.820 LYMPHOCYTOSIS: ICD-10-CM

## 2025-04-16 DIAGNOSIS — D72.819 LEUKOPENIA, UNSPECIFIED TYPE: ICD-10-CM

## 2025-04-16 DIAGNOSIS — Z00.00 HEALTHCARE MAINTENANCE: Primary | ICD-10-CM

## 2025-04-16 DIAGNOSIS — E78.00 PURE HYPERCHOLESTEROLEMIA: ICD-10-CM

## 2025-04-16 DIAGNOSIS — J45.20 MILD INTERMITTENT ASTHMA WITHOUT COMPLICATION: ICD-10-CM

## 2025-04-16 PROCEDURE — 99395 PREV VISIT EST AGE 18-39: CPT | Performed by: FAMILY MEDICINE

## 2025-04-16 NOTE — ASSESSMENT & PLAN NOTE
Stable lungs are clear  Orders:    CBC and differential; Future    Comprehensive metabolic panel; Future    Lipid Panel with Direct LDL reflex; Future

## 2025-04-16 NOTE — ASSESSMENT & PLAN NOTE
Was evaluated by hematology  Orders:    CBC and differential; Future    Comprehensive metabolic panel; Future    Lipid Panel with Direct LDL reflex; Future

## 2025-04-16 NOTE — ASSESSMENT & PLAN NOTE
LDL is increased to 144 however patient is not using fish oil I recommend 3000 mg fish oil daily.  Discussed option of statin patient declined  Orders:    CBC and differential; Future    Comprehensive metabolic panel; Future    Lipid Panel with Direct LDL reflex; Future

## 2025-04-16 NOTE — PATIENT INSTRUCTIONS
Continue with low-fat diet  I recommend fish oil 3000 mg daily  Return in 6 months for office visit and blood work

## 2025-04-16 NOTE — PROGRESS NOTES
Adult Annual Physical  Name: Bonifacio Liriano      : 2000      MRN: [id number]  Encounter Provider: Calos Sellers DO  Encounter Date: 2025   Encounter department: [facility] PRIMARY CARE  Return in 6 months for office visit and blood work    :  Assessment & Plan  Healthcare maintenance  Annual exam completed  Patient to bring in copy of immunization records       Mild intermittent asthma without complication  Stable lungs are clear  Orders:    CBC and differential; Future    Comprehensive metabolic panel; Future    Lipid Panel with Direct LDL reflex; Future    Lymphocytosis  Was evaluated by hematology  Orders:    CBC and differential; Future    Comprehensive metabolic panel; Future    Lipid Panel with Direct LDL reflex; Future    Pure hypercholesterolemia  LDL is increased to 144 however patient is not using fish oil I recommend 3000 mg fish oil daily.  Discussed option of statin patient declined  Orders:    CBC and differential; Future    Comprehensive metabolic panel; Future    Lipid Panel with Direct LDL reflex; Future    Leukopenia, unspecified type  Was evaluated by hematology  Orders:    CBC and differential; Future    Comprehensive metabolic panel; Future    Lipid Panel with Direct LDL reflex; Future        Preventive Screenings:  - Diabetes Screening: screening up-to-date  - Cholesterol Screening: screening not indicated and has hyperlipidemia   - Hepatitis C screening: screening up-to-date   - HIV screening: screening up-to-date   - Lung cancer screening: screening not indicated   - Prostate cancer screening: screening not indicated     Immunizations:  - Immunizations due: Influenza, Prevnar 20, Tdap and Patient told to bring in immunization records    Counseling/Anticipatory Guidance:    - Diet: discussed recommendations for a healthy/well-balanced diet.       Depression Screening and Follow-up Plan: Patient was screened for depression during today's encounter. They screened  "negative with a PHQ-2 score of 1.          History of Present Illness     Adult Annual Physical:  Patient presents for annual physical.     Diet and Physical Activity:  - Diet/Nutrition: well balanced diet, limited junk food and low fat diet. probably eats too much cheese & eggs but good otherwise  - Exercise: walking, moderate cardiovascular exercise, vigorous cardiovascular exercise, strength training exercises, 5-7 times a week on average and 1-2 hours on average.    Depression Screening:  - PHQ-2 Score: 1    General Health:  - Sleep: sleeps well, 7-8 hours of sleep on average and snores loudly. snoring has gotten worse  - Hearing: normal hearing bilateral ears.  - Vision: most recent eye exam < 1 year ago.  - Dental: no dental visits for > 1 year and brushes teeth twice daily.    /GYN Health:  - Follows with GYN: no.   - History of STDs: no     Health:  - History of STDs: no.   - Urinary symptoms: none.     Advanced Care Planning:  - Has an advanced directive?: no    - Has a durable medical POA?: no      Review of Systems   Constitutional: Negative.    HENT: Negative.     Eyes: Negative.    Respiratory: Negative.     Cardiovascular: Negative.    Gastrointestinal: Negative.    Endocrine: Negative.    Genitourinary: Negative.    Musculoskeletal: Negative.    Skin: Negative.    Allergic/Immunologic: Negative.    Neurological: Negative.    Hematological: Negative.    Psychiatric/Behavioral: Negative.           Objective   /70   Pulse 72   Ht 5' 9\" (1.753 m)   Wt 75.3 kg (166 lb)   SpO2 97%   BMI 24.51 kg/m²     Physical Exam  Vitals and nursing note reviewed.   Constitutional:       Appearance: Normal appearance.   HENT:      Head: Normocephalic and atraumatic.      Right Ear: Tympanic membrane normal.      Left Ear: Tympanic membrane normal.      Nose: Nose normal.      Mouth/Throat:      Mouth: Mucous membranes are moist.      Pharynx: Oropharynx is clear. No oropharyngeal exudate or posterior " oropharyngeal erythema.   Eyes:      Conjunctiva/sclera: Conjunctivae normal.   Neck:      Vascular: No carotid bruit.   Cardiovascular:      Rate and Rhythm: Normal rate and regular rhythm.      Heart sounds: Normal heart sounds.   Pulmonary:      Effort: Pulmonary effort is normal.      Breath sounds: Normal breath sounds.   Abdominal:      General: Bowel sounds are normal. There is no distension.      Palpations: Abdomen is soft. There is no mass.      Tenderness: There is no abdominal tenderness. There is no right CVA tenderness, left CVA tenderness, guarding or rebound.      Hernia: No hernia is present.   Musculoskeletal:         General: No swelling, tenderness, deformity or signs of injury.      Cervical back: Neck supple.      Right lower leg: No edema.      Left lower leg: No edema.   Skin:     General: Skin is warm and dry.   Neurological:      General: No focal deficit present.      Mental Status: He is alert and oriented to person, place, and time.      Cranial Nerves: No cranial nerve deficit.   Psychiatric:         Mood and Affect: Mood normal.         Behavior: Behavior normal.         Thought Content: Thought content normal.         Judgment: Judgment normal.

## 2025-05-13 ENCOUNTER — APPOINTMENT (OUTPATIENT)
Dept: URGENT CARE | Age: 25
End: 2025-05-13

## 2025-08-06 ENCOUNTER — OFFICE VISIT (OUTPATIENT)
Dept: FAMILY MEDICINE CLINIC | Facility: CLINIC | Age: 25
End: 2025-08-06
Payer: COMMERCIAL

## 2025-08-06 VITALS
BODY MASS INDEX: 24.2 KG/M2 | HEART RATE: 80 BPM | SYSTOLIC BLOOD PRESSURE: 124 MMHG | WEIGHT: 163.4 LBS | OXYGEN SATURATION: 99 % | DIASTOLIC BLOOD PRESSURE: 80 MMHG | HEIGHT: 69 IN

## 2025-08-06 DIAGNOSIS — R39.89 ABNORMAL URINE COLOR: ICD-10-CM

## 2025-08-06 DIAGNOSIS — R10.2 SUPRAPUBIC DISCOMFORT: ICD-10-CM

## 2025-08-06 DIAGNOSIS — R39.9 UTI SYMPTOMS: Primary | ICD-10-CM

## 2025-08-06 DIAGNOSIS — R10.9 ACUTE RIGHT FLANK PAIN: ICD-10-CM

## 2025-08-06 LAB
SL AMB  POCT GLUCOSE, UA: NEGATIVE
SL AMB LEUKOCYTE ESTERASE,UA: NEGATIVE
SL AMB POCT BILIRUBIN,UA: NEGATIVE
SL AMB POCT BLOOD,UA: NEGATIVE
SL AMB POCT CLARITY,UA: CLEAR
SL AMB POCT COLOR,UA: YELLOW
SL AMB POCT KETONES,UA: NEGATIVE
SL AMB POCT NITRITE,UA: NEGATIVE
SL AMB POCT PH,UA: 6
SL AMB POCT SPECIFIC GRAVITY,UA: 1.02
SL AMB POCT URINE PROTEIN: NEGATIVE
SL AMB POCT UROBILINOGEN: 0.2

## 2025-08-06 PROCEDURE — 81002 URINALYSIS NONAUTO W/O SCOPE: CPT | Performed by: FAMILY MEDICINE

## 2025-08-06 PROCEDURE — 99214 OFFICE O/P EST MOD 30 MIN: CPT | Performed by: FAMILY MEDICINE

## 2025-08-06 RX ORDER — PHENAZOPYRIDINE HYDROCHLORIDE 200 MG/1
200 TABLET, FILM COATED ORAL
Qty: 9 TABLET | Refills: 0 | Status: SHIPPED | OUTPATIENT
Start: 2025-08-06 | End: 2025-08-09

## 2025-08-07 ENCOUNTER — HOSPITAL ENCOUNTER (OUTPATIENT)
Dept: ULTRASOUND IMAGING | Facility: HOSPITAL | Age: 25
Discharge: HOME | End: 2025-08-07
Attending: FAMILY MEDICINE
Payer: COMMERCIAL

## 2025-08-07 DIAGNOSIS — R10.9 ACUTE RIGHT FLANK PAIN: ICD-10-CM

## 2025-08-07 DIAGNOSIS — R10.2 SUPRAPUBIC DISCOMFORT: ICD-10-CM

## 2025-08-07 DIAGNOSIS — R39.89 ABNORMAL URINE COLOR: ICD-10-CM

## 2025-08-07 DIAGNOSIS — R39.9 UTI SYMPTOMS: ICD-10-CM

## 2025-08-07 LAB
BACTERIA UR CULT: NORMAL
C TRACH DNA SPEC QL NAA+PROBE: NEGATIVE
N GONORRHOEA DNA SPEC QL NAA+PROBE: NEGATIVE

## 2025-08-07 PROCEDURE — 76775 US EXAM ABDO BACK WALL LIM: CPT
